# Patient Record
Sex: MALE | Race: WHITE | NOT HISPANIC OR LATINO | Employment: UNEMPLOYED | URBAN - METROPOLITAN AREA
[De-identification: names, ages, dates, MRNs, and addresses within clinical notes are randomized per-mention and may not be internally consistent; named-entity substitution may affect disease eponyms.]

---

## 2023-06-03 ENCOUNTER — HOSPITAL ENCOUNTER (INPATIENT)
Facility: HOSPITAL | Age: 63
LOS: 3 days | Discharge: HOME | End: 2023-06-06
Attending: HOSPITALIST | Admitting: HOSPITALIST
Payer: MEDICAID

## 2023-06-03 DIAGNOSIS — N49.2 SCROTAL ABSCESS: Primary | ICD-10-CM

## 2023-06-03 DIAGNOSIS — L02.91 ABSCESS: ICD-10-CM

## 2023-06-04 ENCOUNTER — ANESTHESIA EVENT (OUTPATIENT)
Dept: PERIOP | Facility: HOSPITAL | Age: 63
End: 2023-06-04
Payer: MEDICAID

## 2023-06-04 ENCOUNTER — ANESTHESIA (OUTPATIENT)
Dept: PERIOP | Facility: HOSPITAL | Age: 63
End: 2023-06-04
Payer: MEDICAID

## 2023-06-04 PROBLEM — N49.2 SCROTAL ABSCESS: Status: ACTIVE | Noted: 2023-06-04

## 2023-06-04 LAB
ANION GAP SERPL CALCULATED.3IONS-SCNC: 12 MMOL/L (ref 5–15)
BASOPHILS # BLD AUTO: 0 10*3/MM3 (ref 0–0.2)
BASOPHILS NFR BLD AUTO: 0.5 % (ref 0–1.5)
BUN SERPL-MCNC: 12 MG/DL (ref 8–23)
BUN/CREAT SERPL: 13.6 (ref 7–25)
CALCIUM SPEC-SCNC: 8.3 MG/DL (ref 8.6–10.5)
CHLORIDE SERPL-SCNC: 101 MMOL/L (ref 98–107)
CO2 SERPL-SCNC: 25 MMOL/L (ref 22–29)
CREAT SERPL-MCNC: 0.88 MG/DL (ref 0.76–1.27)
DEPRECATED RDW RBC AUTO: 47.3 FL (ref 37–54)
EGFRCR SERPLBLD CKD-EPI 2021: 98.4 ML/MIN/1.73
EOSINOPHIL # BLD AUTO: 0.1 10*3/MM3 (ref 0–0.4)
EOSINOPHIL NFR BLD AUTO: 1.3 % (ref 0.3–6.2)
ERYTHROCYTE [DISTWIDTH] IN BLOOD BY AUTOMATED COUNT: 14 % (ref 12.3–15.4)
GLUCOSE SERPL-MCNC: 139 MG/DL (ref 65–99)
HCT VFR BLD AUTO: 42.9 % (ref 37.5–51)
HGB BLD-MCNC: 14.9 G/DL (ref 13–17.7)
LYMPHOCYTES # BLD AUTO: 1.6 10*3/MM3 (ref 0.7–3.1)
LYMPHOCYTES NFR BLD AUTO: 17.6 % (ref 19.6–45.3)
MCH RBC QN AUTO: 31.7 PG (ref 26.6–33)
MCHC RBC AUTO-ENTMCNC: 34.7 G/DL (ref 31.5–35.7)
MCV RBC AUTO: 91.3 FL (ref 79–97)
MONOCYTES # BLD AUTO: 0.8 10*3/MM3 (ref 0.1–0.9)
MONOCYTES NFR BLD AUTO: 9.4 % (ref 5–12)
NEUTROPHILS NFR BLD AUTO: 6.3 10*3/MM3 (ref 1.7–7)
NEUTROPHILS NFR BLD AUTO: 71.2 % (ref 42.7–76)
NRBC BLD AUTO-RTO: 0.3 /100 WBC (ref 0–0.2)
PLATELET # BLD AUTO: 172 10*3/MM3 (ref 140–450)
PMV BLD AUTO: 9 FL (ref 6–12)
POTASSIUM SERPL-SCNC: 3.9 MMOL/L (ref 3.5–5.2)
RBC # BLD AUTO: 4.7 10*6/MM3 (ref 4.14–5.8)
SODIUM SERPL-SCNC: 138 MMOL/L (ref 136–145)
WBC NRBC COR # BLD: 8.9 10*3/MM3 (ref 3.4–10.8)

## 2023-06-04 PROCEDURE — 87205 SMEAR GRAM STAIN: CPT | Performed by: UROLOGY

## 2023-06-04 PROCEDURE — 80048 BASIC METABOLIC PNL TOTAL CA: CPT | Performed by: HOSPITALIST

## 2023-06-04 PROCEDURE — 25010000002 ONDANSETRON PER 1 MG: Performed by: HOSPITALIST

## 2023-06-04 PROCEDURE — 25010000002 SUCCINYLCHOLINE PER 20 MG: Performed by: NURSE ANESTHETIST, CERTIFIED REGISTERED

## 2023-06-04 PROCEDURE — 94640 AIRWAY INHALATION TREATMENT: CPT

## 2023-06-04 PROCEDURE — 0VB50ZZ EXCISION OF SCROTUM, OPEN APPROACH: ICD-10-PCS | Performed by: UROLOGY

## 2023-06-04 PROCEDURE — 25010000002 FENTANYL CITRATE (PF) 100 MCG/2ML SOLUTION: Performed by: NURSE ANESTHETIST, CERTIFIED REGISTERED

## 2023-06-04 PROCEDURE — 25010000002 PROPOFOL 200 MG/20ML EMULSION: Performed by: NURSE ANESTHETIST, CERTIFIED REGISTERED

## 2023-06-04 PROCEDURE — 25010000002 HYDROMORPHONE 1 MG/ML SOLUTION: Performed by: UROLOGY

## 2023-06-04 PROCEDURE — 87076 CULTURE ANAEROBE IDENT EACH: CPT | Performed by: UROLOGY

## 2023-06-04 PROCEDURE — 25010000002 ENOXAPARIN PER 10 MG: Performed by: UROLOGY

## 2023-06-04 PROCEDURE — 88304 TISSUE EXAM BY PATHOLOGIST: CPT | Performed by: UROLOGY

## 2023-06-04 PROCEDURE — 87070 CULTURE OTHR SPECIMN AEROBIC: CPT | Performed by: UROLOGY

## 2023-06-04 PROCEDURE — 87077 CULTURE AEROBIC IDENTIFY: CPT | Performed by: UROLOGY

## 2023-06-04 PROCEDURE — 25010000002 DEXAMETHASONE PER 1 MG: Performed by: NURSE ANESTHETIST, CERTIFIED REGISTERED

## 2023-06-04 PROCEDURE — 25010000002 ONDANSETRON PER 1 MG: Performed by: NURSE ANESTHETIST, CERTIFIED REGISTERED

## 2023-06-04 PROCEDURE — 94799 UNLISTED PULMONARY SVC/PX: CPT

## 2023-06-04 PROCEDURE — 87075 CULTR BACTERIA EXCEPT BLOOD: CPT | Performed by: UROLOGY

## 2023-06-04 PROCEDURE — 85025 COMPLETE CBC W/AUTO DIFF WBC: CPT | Performed by: HOSPITALIST

## 2023-06-04 PROCEDURE — 94664 DEMO&/EVAL PT USE INHALER: CPT

## 2023-06-04 RX ORDER — FENTANYL CITRATE 50 UG/ML
INJECTION, SOLUTION INTRAMUSCULAR; INTRAVENOUS AS NEEDED
Status: DISCONTINUED | OUTPATIENT
Start: 2023-06-04 | End: 2023-06-04 | Stop reason: SURG

## 2023-06-04 RX ORDER — SODIUM CHLORIDE 9 MG/ML
100 INJECTION, SOLUTION INTRAVENOUS CONTINUOUS
Status: DISCONTINUED | OUTPATIENT
Start: 2023-06-04 | End: 2023-06-06 | Stop reason: HOSPADM

## 2023-06-04 RX ORDER — HYDROCODONE BITARTRATE AND ACETAMINOPHEN 7.5; 325 MG/1; MG/1
1 TABLET ORAL EVERY 6 HOURS PRN
Qty: 20 TABLET | Refills: 0 | Status: SHIPPED | OUTPATIENT
Start: 2023-06-04

## 2023-06-04 RX ORDER — IPRATROPIUM BROMIDE AND ALBUTEROL SULFATE 2.5; .5 MG/3ML; MG/3ML
3 SOLUTION RESPIRATORY (INHALATION) ONCE AS NEEDED
Status: COMPLETED | OUTPATIENT
Start: 2023-06-04 | End: 2023-06-04

## 2023-06-04 RX ORDER — LIDOCAINE HYDROCHLORIDE 20 MG/ML
INJECTION, SOLUTION EPIDURAL; INFILTRATION; INTRACAUDAL; PERINEURAL AS NEEDED
Status: DISCONTINUED | OUTPATIENT
Start: 2023-06-04 | End: 2023-06-04 | Stop reason: SURG

## 2023-06-04 RX ORDER — HYDROCODONE BITARTRATE AND ACETAMINOPHEN 7.5; 325 MG/1; MG/1
2 TABLET ORAL EVERY 4 HOURS PRN
Status: DISCONTINUED | OUTPATIENT
Start: 2023-06-04 | End: 2023-06-06 | Stop reason: HOSPADM

## 2023-06-04 RX ORDER — NICOTINE 21 MG/24HR
1 PATCH, TRANSDERMAL 24 HOURS TRANSDERMAL
Status: DISCONTINUED | OUTPATIENT
Start: 2023-06-04 | End: 2023-06-06 | Stop reason: HOSPADM

## 2023-06-04 RX ORDER — PROPOFOL 10 MG/ML
INJECTION, EMULSION INTRAVENOUS AS NEEDED
Status: DISCONTINUED | OUTPATIENT
Start: 2023-06-04 | End: 2023-06-04 | Stop reason: SURG

## 2023-06-04 RX ORDER — SODIUM CHLORIDE 9 MG/ML
40 INJECTION, SOLUTION INTRAVENOUS AS NEEDED
Status: DISCONTINUED | OUTPATIENT
Start: 2023-06-04 | End: 2023-06-06 | Stop reason: HOSPADM

## 2023-06-04 RX ORDER — SUCCINYLCHOLINE CHLORIDE 20 MG/ML
INJECTION INTRAMUSCULAR; INTRAVENOUS AS NEEDED
Status: DISCONTINUED | OUTPATIENT
Start: 2023-06-04 | End: 2023-06-04 | Stop reason: SURG

## 2023-06-04 RX ORDER — ONDANSETRON 2 MG/ML
4 INJECTION INTRAMUSCULAR; INTRAVENOUS EVERY 6 HOURS PRN
Status: DISCONTINUED | OUTPATIENT
Start: 2023-06-04 | End: 2023-06-04 | Stop reason: SDUPTHER

## 2023-06-04 RX ORDER — SODIUM CHLORIDE 0.9 % (FLUSH) 0.9 %
10 SYRINGE (ML) INJECTION EVERY 12 HOURS SCHEDULED
Status: DISCONTINUED | OUTPATIENT
Start: 2023-06-04 | End: 2023-06-06 | Stop reason: HOSPADM

## 2023-06-04 RX ORDER — BISACODYL 10 MG
10 SUPPOSITORY, RECTAL RECTAL DAILY PRN
Status: DISCONTINUED | OUTPATIENT
Start: 2023-06-04 | End: 2023-06-06 | Stop reason: HOSPADM

## 2023-06-04 RX ORDER — ENOXAPARIN SODIUM 100 MG/ML
40 INJECTION SUBCUTANEOUS EVERY 12 HOURS SCHEDULED
Status: DISCONTINUED | OUTPATIENT
Start: 2023-06-04 | End: 2023-06-06 | Stop reason: HOSPADM

## 2023-06-04 RX ORDER — LEVOFLOXACIN 500 MG/1
500 TABLET, FILM COATED ORAL DAILY
Qty: 20 TABLET | Refills: 0 | Status: SHIPPED | OUTPATIENT
Start: 2023-06-04 | End: 2023-06-06 | Stop reason: HOSPADM

## 2023-06-04 RX ORDER — SODIUM CHLORIDE 0.9 % (FLUSH) 0.9 %
10 SYRINGE (ML) INJECTION AS NEEDED
Status: DISCONTINUED | OUTPATIENT
Start: 2023-06-04 | End: 2023-06-06 | Stop reason: HOSPADM

## 2023-06-04 RX ORDER — ONDANSETRON 2 MG/ML
4 INJECTION INTRAMUSCULAR; INTRAVENOUS EVERY 6 HOURS PRN
Status: DISCONTINUED | OUTPATIENT
Start: 2023-06-04 | End: 2023-06-06 | Stop reason: HOSPADM

## 2023-06-04 RX ORDER — DEXAMETHASONE SODIUM PHOSPHATE 4 MG/ML
INJECTION, SOLUTION INTRA-ARTICULAR; INTRALESIONAL; INTRAMUSCULAR; INTRAVENOUS; SOFT TISSUE AS NEEDED
Status: DISCONTINUED | OUTPATIENT
Start: 2023-06-04 | End: 2023-06-04 | Stop reason: SURG

## 2023-06-04 RX ORDER — NALOXONE HCL 0.4 MG/ML
0.1 VIAL (ML) INJECTION
Status: DISCONTINUED | OUTPATIENT
Start: 2023-06-04 | End: 2023-06-06

## 2023-06-04 RX ORDER — NALOXONE HCL 0.4 MG/ML
0.4 VIAL (ML) INJECTION AS NEEDED
Status: DISCONTINUED | OUTPATIENT
Start: 2023-06-04 | End: 2023-06-06 | Stop reason: HOSPADM

## 2023-06-04 RX ORDER — CLINDAMYCIN PHOSPHATE 600 MG/50ML
600 INJECTION, SOLUTION INTRAVENOUS EVERY 8 HOURS
Status: DISCONTINUED | OUTPATIENT
Start: 2023-06-04 | End: 2023-06-06

## 2023-06-04 RX ORDER — AMOXICILLIN 250 MG
2 CAPSULE ORAL 2 TIMES DAILY
Status: DISCONTINUED | OUTPATIENT
Start: 2023-06-04 | End: 2023-06-06 | Stop reason: HOSPADM

## 2023-06-04 RX ORDER — ESMOLOL HYDROCHLORIDE 10 MG/ML
INJECTION INTRAVENOUS AS NEEDED
Status: DISCONTINUED | OUTPATIENT
Start: 2023-06-04 | End: 2023-06-04 | Stop reason: SURG

## 2023-06-04 RX ORDER — ACETAMINOPHEN 325 MG/1
650 TABLET ORAL EVERY 4 HOURS PRN
Status: DISCONTINUED | OUTPATIENT
Start: 2023-06-04 | End: 2023-06-06 | Stop reason: HOSPADM

## 2023-06-04 RX ORDER — NALOXONE HCL 0.4 MG/ML
0.4 VIAL (ML) INJECTION
Status: DISCONTINUED | OUTPATIENT
Start: 2023-06-04 | End: 2023-06-06

## 2023-06-04 RX ORDER — POLYETHYLENE GLYCOL 3350 17 G/17G
17 POWDER, FOR SOLUTION ORAL DAILY PRN
Status: DISCONTINUED | OUTPATIENT
Start: 2023-06-04 | End: 2023-06-06 | Stop reason: HOSPADM

## 2023-06-04 RX ORDER — SODIUM CHLORIDE, SODIUM LACTATE, POTASSIUM CHLORIDE, CALCIUM CHLORIDE 600; 310; 30; 20 MG/100ML; MG/100ML; MG/100ML; MG/100ML
INJECTION, SOLUTION INTRAVENOUS CONTINUOUS PRN
Status: DISCONTINUED | OUTPATIENT
Start: 2023-06-04 | End: 2023-06-04 | Stop reason: SURG

## 2023-06-04 RX ORDER — ACETAMINOPHEN 650 MG/1
650 SUPPOSITORY RECTAL EVERY 4 HOURS PRN
Status: DISCONTINUED | OUTPATIENT
Start: 2023-06-04 | End: 2023-06-06 | Stop reason: HOSPADM

## 2023-06-04 RX ORDER — ONDANSETRON 2 MG/ML
INJECTION INTRAMUSCULAR; INTRAVENOUS AS NEEDED
Status: DISCONTINUED | OUTPATIENT
Start: 2023-06-04 | End: 2023-06-04 | Stop reason: SURG

## 2023-06-04 RX ORDER — ALBUTEROL SULFATE 2.5 MG/3ML
SOLUTION RESPIRATORY (INHALATION) AS NEEDED
Status: DISCONTINUED | OUTPATIENT
Start: 2023-06-04 | End: 2023-06-04 | Stop reason: SURG

## 2023-06-04 RX ORDER — BISACODYL 5 MG/1
5 TABLET, DELAYED RELEASE ORAL DAILY PRN
Status: DISCONTINUED | OUTPATIENT
Start: 2023-06-04 | End: 2023-06-06 | Stop reason: HOSPADM

## 2023-06-04 RX ORDER — FENTANYL CITRATE 50 UG/ML
50 INJECTION, SOLUTION INTRAMUSCULAR; INTRAVENOUS
Status: DISCONTINUED | OUTPATIENT
Start: 2023-06-04 | End: 2023-06-06

## 2023-06-04 RX ORDER — ONDANSETRON 4 MG/1
4 TABLET, FILM COATED ORAL EVERY 6 HOURS PRN
Status: DISCONTINUED | OUTPATIENT
Start: 2023-06-04 | End: 2023-06-06 | Stop reason: HOSPADM

## 2023-06-04 RX ADMIN — FENTANYL CITRATE 50 MCG: 50 INJECTION, SOLUTION INTRAMUSCULAR; INTRAVENOUS at 13:37

## 2023-06-04 RX ADMIN — SODIUM CHLORIDE, SODIUM LACTATE, POTASSIUM CHLORIDE, AND CALCIUM CHLORIDE: .6; .31; .03; .02 INJECTION, SOLUTION INTRAVENOUS at 13:32

## 2023-06-04 RX ADMIN — SODIUM CHLORIDE 100 ML/HR: 9 INJECTION, SOLUTION INTRAVENOUS at 22:02

## 2023-06-04 RX ADMIN — ONDANSETRON 4 MG: 2 INJECTION INTRAMUSCULAR; INTRAVENOUS at 14:11

## 2023-06-04 RX ADMIN — Medication 10 ML: at 08:57

## 2023-06-04 RX ADMIN — SUCCINYLCHOLINE CHLORIDE 180 MG: 20 INJECTION, SOLUTION INTRAMUSCULAR; INTRAVENOUS at 13:43

## 2023-06-04 RX ADMIN — PROPOFOL 250 MG: 10 INJECTION, EMULSION INTRAVENOUS at 13:42

## 2023-06-04 RX ADMIN — Medication 10 ML: at 02:27

## 2023-06-04 RX ADMIN — NICOTINE 1 PATCH: 21 PATCH, EXTENDED RELEASE TRANSDERMAL at 19:01

## 2023-06-04 RX ADMIN — FENTANYL CITRATE 50 MCG: 50 INJECTION, SOLUTION INTRAMUSCULAR; INTRAVENOUS at 13:32

## 2023-06-04 RX ADMIN — CLINDAMYCIN PHOSPHATE 600 MG: 600 INJECTION, SOLUTION INTRAVENOUS at 10:55

## 2023-06-04 RX ADMIN — DEXAMETHASONE SODIUM PHOSPHATE 8 MG: 4 INJECTION, SOLUTION INTRAMUSCULAR; INTRAVENOUS at 13:45

## 2023-06-04 RX ADMIN — ONDANSETRON 4 MG: 2 INJECTION INTRAMUSCULAR; INTRAVENOUS at 05:25

## 2023-06-04 RX ADMIN — CLINDAMYCIN PHOSPHATE 600 MG: 600 INJECTION, SOLUTION INTRAVENOUS at 02:20

## 2023-06-04 RX ADMIN — HYDROMORPHONE HYDROCHLORIDE 0.5 MG: 1 INJECTION, SOLUTION INTRAMUSCULAR; INTRAVENOUS; SUBCUTANEOUS at 16:52

## 2023-06-04 RX ADMIN — ALBUTEROL SULFATE 2.5 MG: 2.5 SOLUTION RESPIRATORY (INHALATION) at 13:46

## 2023-06-04 RX ADMIN — IPRATROPIUM BROMIDE AND ALBUTEROL SULFATE 3 ML: .5; 3 SOLUTION RESPIRATORY (INHALATION) at 14:39

## 2023-06-04 RX ADMIN — ENOXAPARIN SODIUM 40 MG: 100 INJECTION SUBCUTANEOUS at 20:31

## 2023-06-04 RX ADMIN — SODIUM CHLORIDE 100 ML/HR: 9 INJECTION, SOLUTION INTRAVENOUS at 16:54

## 2023-06-04 RX ADMIN — LIDOCAINE HYDROCHLORIDE 100 MG: 20 INJECTION, SOLUTION EPIDURAL; INFILTRATION; INTRACAUDAL; PERINEURAL at 13:39

## 2023-06-04 RX ADMIN — SENNOSIDES AND DOCUSATE SODIUM 2 TABLET: 50; 8.6 TABLET ORAL at 20:31

## 2023-06-04 RX ADMIN — HYDROCODONE BITARTRATE AND ACETAMINOPHEN 2 TABLET: 7.5; 325 TABLET ORAL at 21:49

## 2023-06-04 RX ADMIN — ESMOLOL HYDROCHLORIDE 30 MG: 10 INJECTION, SOLUTION INTRAVENOUS at 14:17

## 2023-06-04 RX ADMIN — CLINDAMYCIN PHOSPHATE 600 MG: 600 INJECTION, SOLUTION INTRAVENOUS at 16:55

## 2023-06-04 NOTE — CONSULTS
Urology Consult Note    Patient:Godwin Gamez Jr. :1960  Room:[facility]  Admit Date6/3/2023  Age:62 y.o.     SEX:male     DOS:2023     MR:[id number]     Visit:[id number]       Attending: Isak Bailon DO  Referring Provider: Dr. Bailon  Reason for Consultation: Right testicular abscess    Patient Care Team:  Provider, No Known as PCP - General    Chief complaint right testicular pain    Subjective .     History of present illness: Patient is a 62-year-old white male who is had a 1 month history of a right testicle infection.  He has been on antibiotics and not getting better.  He was transferred from [facility] last night for a definitive treatment.    Review of Systems  12 point review of systems were reviewed and are negative except for what is in HPI.    History  History reviewed. No pertinent past medical history.  History reviewed. No pertinent surgical history.  Social History     Socioeconomic History    Marital status:    Tobacco Use    Smoking status: Every Day     Packs/day: 2.00     Years: 40.00     Pack years: 80.00     Types: Cigarettes   Vaping Use    Vaping Use: Unknown   Substance and Sexual Activity    Drug use: Not Currently    Sexual activity: Defer     History reviewed. No pertinent family history.  Allergies   Allergen Reactions    Valium [Diazepam] Hallucinations    Penicillins Hives     Prior to Admission medications    Not on File         Objective     tMax 24 hours:  Temp (24hrs), Av °F (36.7 °C), Min:97.5 °F (36.4 °C), Max:98.3 °F (36.8 °C)    Vital Sign Ranges:  Temp:  [97.5 °F (36.4 °C)-98.3 °F (36.8 °C)] 98.3 °F (36.8 °C)  Heart Rate:  [72-82] 82  Resp:  [14-15] 14  BP: (112-121)/(67-98) 112/98  Intake and Output Last 3 Shifts:  I/O last 3 completed shifts:  In: -   Out: 500 [Urine:500]      Physical Exam:     General Appearance:    Alert, cooperative, in no acute distress   Head:    Normocephalic, without obvious abnormality, atraumatic   Eyes:             Lids and lashes normal, conjunctivae and sclerae normal, no   icterus, no pallor, corneas clear, PERRLA   Ears:    Ears appear intact with no abnormalities noted   Throat:   No oral lesions, no thrush, oral mucosa moist   Neck:   No adenopathy, supple, trachea midline, no thyromegaly, no   carotid bruit, no JVD   Back:     No kyphosis present, no scoliosis present, no skin lesions,      erythema or scars, no tenderness to percussion or                   palpation,   range of motion normal   Lungs:     Clear to auscultation,respirations regular, even and                  unlabored    Heart:    Regular rhythm and normal rate, normal S1 and S2, no            murmur, no gallop, no rub, no click   Chest Wall:    No abnormalities observed   Abdomen:     Normal bowel sounds, no masses, no organomegaly, soft        non-tender, non-distended, no guarding, no rebound                tenderness   Rectal:     Deferred   Extremities:   Moves all extremities well, no edema, no cyanosis, no             redness   Pulses:   Pulses palpable and equal bilaterally   Skin:   No bleeding, bruising or rash   Lymph nodes:   No palpable adenopathy   Neurologic:   Cranial nerves 2 - 12 grossly intact, sensation intact, DTR       present and equal bilaterally       Results Review:     Lab Results (last 24 hours)       Procedure Component Value Units Date/Time    Basic Metabolic Panel [[id number]]  (Abnormal) Collected: 06/04/23 0400    Specimen: Blood Updated: 06/04/23 0442     Glucose 139 mg/dL      BUN 12 mg/dL      Creatinine 0.88 mg/dL      Sodium 138 mmol/L      Potassium 3.9 mmol/L      Chloride 101 mmol/L      CO2 25.0 mmol/L      Calcium 8.3 mg/dL      BUN/Creatinine Ratio 13.6     Anion Gap 12.0 mmol/L      eGFR 98.4 mL/min/1.73     Narrative:      GFR Normal >60  Chronic Kidney Disease <60  Kidney Failure <15      CBC & Differential [[id number]]  (Abnormal) Collected: 06/04/23 0400    Specimen: Blood Updated: 06/04/23 0417     Narrative:      The following orders were created for panel order CBC & Differential.  Procedure                               Abnormality         Status                     ---------                               -----------         ------                     CBC Auto Differential[[id number]]        Abnormal            Final result                 Please view results for these tests on the individual orders.    CBC Auto Differential [[id number]]  (Abnormal) Collected: 06/04/23 0400    Specimen: Blood Updated: 06/04/23 0417     WBC 8.90 10*3/mm3      RBC 4.70 10*6/mm3      Hemoglobin 14.9 g/dL      Hematocrit 42.9 %      MCV 91.3 fL      MCH 31.7 pg      MCHC 34.7 g/dL      RDW 14.0 %      RDW-SD 47.3 fl      MPV 9.0 fL      Platelets 172 10*3/mm3      Neutrophil % 71.2 %      Lymphocyte % 17.6 %      Monocyte % 9.4 %      Eosinophil % 1.3 %      Basophil % 0.5 %      Neutrophils, Absolute 6.30 10*3/mm3      Lymphocytes, Absolute 1.60 10*3/mm3      Monocytes, Absolute 0.80 10*3/mm3      Eosinophils, Absolute 0.10 10*3/mm3      Basophils, Absolute 0.00 10*3/mm3      nRBC 0.3 /100 WBC            No results found for: URINECX     Imaging Results (Last 7 Days)       ** No results found for the last 168 hours. **            Inpatient Meds:   Scheduled Meds:clindamycin, 600 mg, Intravenous, Q8H  enoxaparin, 40 mg, Subcutaneous, Q12H  senna-docusate sodium, 2 tablet, Oral, BID  sodium chloride, 10 mL, Intravenous, Q12H       Continuous Infusions:    PRN Meds:.  senna-docusate sodium **AND** polyethylene glycol **AND** bisacodyl **AND** bisacodyl    ondansetron    sodium chloride    sodium chloride      Assessment & Plan     Right testicular abscess/orchitis    On antibiotics and plan emergent incision and drainage of right testicular abscess and possible right orchiectomy.  Discussed with patient and wife all risk benefits and options and they are willing to proceed    I discussed the patient's findings and my  recommendations with patient.    Cristian Vaughn MD  06/04/23  08:25 EDT

## 2023-06-04 NOTE — PLAN OF CARE
Goal Outcome Evaluation:         Patient awaiting I&D this afternoon. Wife at bedside. Continues to have pain in right testicle. IV ABT continues

## 2023-06-04 NOTE — H&P
[facility]      Patient Name: Godwin Gamez Jr.  : 1960  MRN: [id number]  Primary Care Physician:  Provider, No Known  Date of admission: 6/3/2023      Subjective      Chief Complaint: Scrotal swelling    History of Present Illness: Godwin Gamez Jr. is a 62 y.o. male who presented to [facility] on 6/3/2023 from [facility] after he developed scrotal swelling for the last 3 to 4 weeks.  He said initially there was a small swelling with a spontaneous drainage, now over the last week or 2 the swelling continued to get worse, patient noted to have redness and firm abscess with no spontaneous drainage on right side of the scrotum was noted.  Patient received a dose of IV clindamycin, urology service with Dr. Vaughn was spoken from [facility], he advised hospital service to admit with consult to him.      Review of Systems   All other systems reviewed and are negative.     Personal History     History reviewed. No pertinent past medical history.  Past medical history denies history of diabetes, no hypertension.  Patient does have medical history of tobacco abuse.  History reviewed. No pertinent surgical history.    Family History: family history is not on file. Otherwise pertinent FHx was reviewed and not pertinent to current issue.    Social History:  reports that he has been smoking cigarettes. He has a 80.00 pack-year smoking history. He does not have any smokeless tobacco history on file. He reports that he does not currently use drugs.    Home Medications:  Prior to Admission Medications       None              Allergies:  Allergies   Allergen Reactions    Valium [Diazepam] Hallucinations    Penicillins Hives       Objective      Vitals:   Temp:  [97.5 °F (36.4 °C)] 97.5 °F (36.4 °C)  Heart Rate:  [72] 72  Resp:  [15] 15  BP: (121)/(73) 121/73    Physical Exam  Vitals and nursing note reviewed.   Constitutional:       General: He is not  in acute distress.     Appearance: Normal appearance. He is well-developed. He is not ill-appearing, toxic-appearing or diaphoretic.   HENT:      Head: Normocephalic and atraumatic.      Right Ear: Ear canal and external ear normal.      Left Ear: Ear canal and external ear normal.      Nose: Nose normal. No congestion or rhinorrhea.      Mouth/Throat:      Mouth: Mucous membranes are moist.      Pharynx: No oropharyngeal exudate.   Eyes:      General: No scleral icterus.        Right eye: No discharge.         Left eye: No discharge.      Extraocular Movements: Extraocular movements intact.      Conjunctiva/sclera: Conjunctivae normal.      Pupils: Pupils are equal, round, and reactive to light.   Neck:      Thyroid: No thyromegaly.      Vascular: No carotid bruit or JVD.      Trachea: No tracheal deviation.   Cardiovascular:      Rate and Rhythm: Normal rate and regular rhythm.      Pulses: Normal pulses.      Heart sounds: Normal heart sounds. No murmur heard.    No friction rub. No gallop.   Pulmonary:      Effort: Pulmonary effort is normal. No respiratory distress.      Breath sounds: Normal breath sounds. No stridor. No wheezing, rhonchi or rales.   Chest:      Chest wall: No tenderness.   Abdominal:      General: Bowel sounds are normal. There is no distension.      Palpations: Abdomen is soft. There is no mass.      Tenderness: There is no abdominal tenderness. There is no guarding or rebound.      Hernia: No hernia is present.   Musculoskeletal:         General: No swelling, tenderness, deformity or signs of injury. Normal range of motion.      Cervical back: Normal range of motion and neck supple. No rigidity. No muscular tenderness.      Right lower leg: No edema.      Left lower leg: No edema.   Lymphadenopathy:      Cervical: No cervical adenopathy.   Skin:     General: Skin is warm and dry.      Coloration: Skin is not jaundiced or pale.      Findings: No bruising, erythema or rash.   Neurological:       General: No focal deficit present.      Mental Status: He is alert and oriented to person, place, and time. Mental status is at baseline.      Cranial Nerves: No cranial nerve deficit.      Sensory: No sensory deficit.      Motor: No weakness or abnormal muscle tone.      Coordination: Coordination normal.   Psychiatric:         Mood and Affect: Mood normal.         Behavior: Behavior normal.         Thought Content: Thought content normal.         Judgment: Judgment normal.        Result Review    Result Review:  I have personally reviewed the results from the time of this admission to 6/4/2023 00:46 EDT and agree with these findings:  [x]  Laboratory  [x]  Microbiology  [x]  Radiology  []  EKG/Telemetry   []  Cardiology/Vascular   []  Pathology  []  Old records  []  Other:  Most notable findings include:       Assessment & Plan        Active Hospital Problems:  Active Hospital Problems    Diagnosis     **Scrotal abscess      Plan:     Scrotal abscess right side, IV clindamycin, urology consult to evaluate for incision and drainage, wound cultures at the time of incision and drainage, patient may require infectious disease consult.    Tobacco abuse, patient counseled for cessation.      DVT prophylaxis:  Medical DVT prophylaxis orders are present.    CODE STATUS:    Level Of Support Discussed With: Patient  Code Status (Patient has no pulse and is not breathing): CPR (Attempt to Resuscitate)  Medical Interventions (Patient has pulse or is breathing): Full Support    Admission Status:  I believe this patient meets inpatient status.    I discussed the patient's findings and my recommendations with patient.    This patient has been examined wearing appropriate Personal Protective Equipment and discussed with hospital infection control department. 06/04/23      Signature:

## 2023-06-04 NOTE — ANESTHESIA POSTPROCEDURE EVALUATION
Patient: Godwin Gamez Jr.    Procedure Summary       Date: 06/04/23 Room / Location: [facility] OR [address] [place] [address] MAIN OR    Anesthesia Start: 1332 Anesthesia Stop: 1426    Procedure: EXCISION OF RIGHT SCROTAL ABSCESS WITH CULTURE (Right) Diagnosis:     Surgeons: Cristian Vaughn MD Provider: Sima Tobar CRNA    Anesthesia Type: general ASA Status: 3            Anesthesia Type: No value filed.    Vitals  Vitals Value Taken Time   /94 06/04/23 1513   Temp 98.7 °F (37.1 °C) 06/04/23 1513   Pulse 104 06/04/23 1513   Resp 17 06/04/23 1513   SpO2 94 % 06/04/23 1513           Post Anesthesia Care and Evaluation    Patient location during evaluation: PACU  Patient participation: complete - patient participated  Level of consciousness: awake  Pain scale: See nurse's notes for pain score.  Pain management: adequate    Airway patency: patent  Anesthetic complications: No anesthetic complications  PONV Status: none  Cardiovascular status: acceptable  Respiratory status: acceptable and spontaneous ventilation  Hydration status: acceptable    Comments: Patient seen and examined postoperatively; vital signs stable; SpO2 greater than or equal to 90%; cardiopulmonary status stable; nausea/vomiting adequately controlled; pain adequately controlled; no apparent anesthesia complications; patient discharged from anesthesia care when discharge criteria were met

## 2023-06-04 NOTE — CONSULTS
LOS: 1 day   Patient Care Team:  Provider, No Known as PCP - General        Subjective       Attending MD : Isak Bailon DO    Patient Complaints: pain         History of Present Illness  :: Godwin Gamez Jr. is a 62 y.o. male who presented to [facility] on 6/3/2023 from [facility] after he developed scrotal swelling for the last 3 to 4 weeks.  He said initially there was a small swelling with a spontaneous drainage, now over the last week or 2 the swelling continued to get worse, patient noted to have redness and firm abscess with no spontaneous drainage on right side of the scrotum was noted.  Patient received a dose of IV clindamycin,      Patient Denies:  NV    PMH :   Scrotal abscess      Review of Systems:    Pain    Objective     Vital Signs  Temp:  [97.5 °F (36.4 °C)-98.3 °F (36.8 °C)] 98.2 °F (36.8 °C)  Heart Rate:  [] 109  Resp:  [10-26] 23  BP: (112-152)/(67-98) 152/86    Physical Exam:     General Appearance:    Alert, cooperative, in no acute distress   Head:    Normocephalic, without obvious abnormality, atraumatic   Eyes:            Lids and lashes normal, conjunctivae and sclerae normal, no   icterus, no pallor, corneas clear, PERRLA   Ears:    Ears appear intact with no abnormalities noted   Throat:   No oral lesions, no thrush, oral mucosa moist   Neck:   No adenopathy, supple, trachea midline, no thyromegaly, no   carotid bruit, no JVD   Back:     No kyphosis present, no scoliosis present, no skin lesions,      erythema or scars, no tenderness to percussion or                   palpation,   range of motion normal   Lungs:     Clear to auscultation,respirations regular, even and                  unlabored    Heart:    Regular rhythm and normal rate, normal S1 and S2, no            murmur, no gallop, no rub, no click   Chest Wall:    No abnormalities observed   Abdomen:     Normal bowel sounds, no masses, no organomegaly, soft        non-tender, non-distended,  no guarding, no rebound                tenderness   Rectal:     Deferred   Extremities:   Moves all extremities well, no edema, no cyanosis, no             redness   Pulses:   Pulses palpable and equal bilaterally   Skin:   No bleeding, bruising or rash   Lymph nodes:   No palpable adenopathy   Neurologic:   Cranial nerves 2 - 12 grossly intact, sensation intact, DTR       present and equal bilaterally          Results Review:    Lab Results (last 72 hours)       Procedure Component Value Units Date/Time    Anaerobic Culture - Wound, Scrotum [[id number]] Collected: 06/04/23 1356    Specimen: Wound from Scrotum Updated: 06/04/23 1403    Wound Culture - Wound, Scrotum [[id number]] Collected: 06/04/23 1356    Specimen: Wound from Scrotum Updated: 06/04/23 1403    Basic Metabolic Panel [[id number]]  (Abnormal) Collected: 06/04/23 0400    Specimen: Blood Updated: 06/04/23 0442     Glucose 139 mg/dL      BUN 12 mg/dL      Creatinine 0.88 mg/dL      Sodium 138 mmol/L      Potassium 3.9 mmol/L      Chloride 101 mmol/L      CO2 25.0 mmol/L      Calcium 8.3 mg/dL      BUN/Creatinine Ratio 13.6     Anion Gap 12.0 mmol/L      eGFR 98.4 mL/min/1.73     Narrative:      GFR Normal >60  Chronic Kidney Disease <60  Kidney Failure <15      CBC & Differential [[id number]]  (Abnormal) Collected: 06/04/23 0400    Specimen: Blood Updated: 06/04/23 0417    Narrative:      The following orders were created for panel order CBC & Differential.  Procedure                               Abnormality         Status                     ---------                               -----------         ------                     CBC Auto Differential[[id number]]        Abnormal            Final result                 Please view results for these tests on the individual orders.    CBC Auto Differential [[id number]]  (Abnormal) Collected: 06/04/23 0400    Specimen: Blood Updated: 06/04/23 0417     WBC 8.90 10*3/mm3      RBC 4.70 10*6/mm3      Hemoglobin 14.9  g/dL      Hematocrit 42.9 %      MCV 91.3 fL      MCH 31.7 pg      MCHC 34.7 g/dL      RDW 14.0 %      RDW-SD 47.3 fl      MPV 9.0 fL      Platelets 172 10*3/mm3      Neutrophil % 71.2 %      Lymphocyte % 17.6 %      Monocyte % 9.4 %      Eosinophil % 1.3 %      Basophil % 0.5 %      Neutrophils, Absolute 6.30 10*3/mm3      Lymphocytes, Absolute 1.60 10*3/mm3      Monocytes, Absolute 0.80 10*3/mm3      Eosinophils, Absolute 0.10 10*3/mm3      Basophils, Absolute 0.00 10*3/mm3      nRBC 0.3 /100 WBC                 Imaging Results (Last 72 Hours)       ** No results found for the last 72 hours. **              Medication Review:      Hospital Medications (active)         Dose Frequency Start End    bisacodyl (DULCOLAX) EC tablet 5 mg ([MAR Hold] since 6/4/2023  1:12 PM) 5 mg Daily PRN 6/4/2023     Admin Instructions: Use if no bowel movement after 12 hours.  Swallow whole. Do not crush, split, or chew tablet.    Route: Oral    Linked Group 1: See Hyperspace for full Linked Orders Report.        bisacodyl (DULCOLAX) suppository 10 mg ([MAR Hold] since 6/4/2023  1:12 PM) 10 mg Daily PRN 6/4/2023     Admin Instructions: Use if no bowel movement after 12 hours.  Hold for diarrhea    Route: Rectal    Linked Group 1: See Hyperspace for full Linked Orders Report.        clindamycin (CLEOCIN) 600 mg in sodium chloride 0.9% 50 mL IVPB (premix) 600 mg Every 8 Hours 6/4/2023 6/11/2023    Admin Instructions: Do Not refrigerate.    Route: Intravenous    Enoxaparin Sodium (LOVENOX) syringe 40 mg ([MAR Hold] since 6/4/2023  1:12 PM) 40 mg Every 12 Hours Scheduled 6/4/2023     Admin Instructions: Give subcutaneous in abdomen only. Do not massage site after injection.    Route: Subcutaneous    fentaNYL citrate (PF) (SUBLIMAZE) injection 50 mcg 50 mcg Every 15 Minutes PRN 6/4/2023     Admin Instructions: Maximum total dose of fentaNYL is 100 mcg. Based on patient request - if ordered for moderate or severe pain, provider allows for  "administration of a medication prescribed for a lower pain scale.  If given for pain, use the following pain scale:  Mild Pain = Pain Score of 1-3, CPOT 1-2  Moderate Pain = Pain Score of 4-6, CPOT 3-4  Severe Pain = Pain Score of 7-10, CPOT 5-8    Route: Intravenous    HYDROmorphone (DILAUDID) injection 0.5 mg 0.5 mg Every 15 Minutes PRN 6/4/2023     Admin Instructions: Maximum total dose of HYDROmorphone is 2 mg. Based on patient request - if ordered for moderate or severe pain, provider allows for administration of a medication prescribed for a lower pain scale.      Caution: Look alike/sound alike drug alert    If given for pain, use the following pain scale:  Mild Pain = Pain Score of 1-3, CPOT 1-2  Moderate Pain = Pain Score of 4-6, CPOT 3-4  Severe Pain = Pain Score of 7-10, CPOT 5-8    Route: Intravenous    naloxone (NARCAN) injection 0.4 mg 0.4 mg As Needed 6/4/2023     Route: Intravenous    ondansetron (ZOFRAN) injection 4 mg ([MAR Hold] since 6/4/2023  1:12 PM) 4 mg Every 6 Hours PRN 6/4/2023     Admin Instructions: \"If multiple N/V medications ordered, use in the following order: Ondansetron, Prochlorperazine, Promethazine. Use PO unless patient refuses or patient unable to swallow.\"      Route: Intravenous    polyethylene glycol (MIRALAX) packet 17 g ([MAR Hold] since 6/4/2023  1:12 PM) 17 g Daily PRN 6/4/2023     Admin Instructions: Use if no bowel movement after 12 hours. Mix in 6-8 ounces of water.  Use 4-8 ounces of water, tea, or juice for each 17 gram dose.    Route: Oral    Linked Group 1: See Hyperspace for full Linked Orders Report.        sennosides-docusate (PERICOLACE) 8.6-50 MG per tablet 2 tablet ([MAR Hold] since 6/4/2023  1:12 PM) 2 tablet 2 Times Daily 6/4/2023     Admin Instructions: HOLD MEDICATION IF PATIENT HAS HAD BOWEL MOVEMENT. Start bowel management regimen if patient has not had a bowel movement after 12 hours.    Route: Oral    Linked Group 1: See Hyperspace for full Linked " Orders Report.        sodium chloride 0.9 % flush 10 mL ([MAR Hold] since 6/4/2023  1:12 PM) 10 mL Every 12 Hours Scheduled 6/4/2023     Route: Intravenous    sodium chloride 0.9 % flush 10 mL ([MAR Hold] since 6/4/2023  1:12 PM) 10 mL As Needed 6/4/2023     Route: Intravenous    sodium chloride 0.9 % infusion 40 mL ([MAR Hold] since 6/4/2023  1:12 PM) 40 mL As Needed 6/4/2023     Admin Instructions: Following administration of an IV intermittent medication, flush line with 40mL NS at 100mL/hr.    Route: Intravenous            Assessment & Plan         Scrotal abscess      S/P I&D    Plan :    IV clinda  I&D  C&S  Will follow  Thank you     Reilly Calderon MD  06/04/23  14:45 EDT

## 2023-06-04 NOTE — ANESTHESIA PROCEDURE NOTES
Airway  Urgency: elective    Date/Time: 6/4/2023 1:44 PM  Airway not difficult    General Information and Staff    Patient location during procedure: OR  CRNA/CAA: Sima Tobar CRNA    Indications and Patient Condition  Indications for airway management: airway protection    Preoxygenated: yes  MILS maintained throughout  Mask difficulty assessment: 0 - not attempted    Final Airway Details  Final airway type: endotracheal airway      Successful airway: ETT  Cuffed: yes   Successful intubation technique: video laryngoscopy  Facilitating devices/methods: intubating stylet  Endotracheal tube insertion site: oral  Blade: Kahn  Blade size: 4  ETT size (mm): 7.0  Cormack-Lehane Classification: grade I - full view of glottis  Placement verified by: chest auscultation and capnometry   Cuff volume (mL): 8  Measured from: gums  ETT/EBT to gums (cm): 22  Number of attempts at approach: 1  Assessment: lips, teeth, and gum same as pre-op and atraumatic intubation    Additional Comments  Preoxygenation, smooth IV induction. EMV with oral airway. Head/neck neutral during induction/intubation.

## 2023-06-04 NOTE — ANESTHESIA PREPROCEDURE EVALUATION
Anesthesia Evaluation     Patient summary reviewed and Nursing notes reviewed   NPO Solid Status: > 8 hours  NPO Liquid Status: > 8 hours           Airway   Mallampati: II  TM distance: >3 FB  Neck ROM: full  No difficulty expected  Dental    (+) edentulous    Pulmonary    (+) a smoker Current Abstained day of surgery,shortness of breath, wheezes  Cardiovascular     Rhythm: regular  Rate: normal        Neuro/Psych  GI/Hepatic/Renal/Endo      Musculoskeletal     Abdominal    Substance History      OB/GYN          Other        ROS/Med Hx Other: Hasn't been to the doctor in >10 years.     Smokes 2 packs a day.                  Anesthesia Plan    ASA 3     general     intravenous induction     Anesthetic plan, risks, benefits, and alternatives have been provided, discussed and informed consent has been obtained with: patient.    CODE STATUS:    Level Of Support Discussed With: Patient  Code Status (Patient has no pulse and is not breathing): CPR (Attempt to Resuscitate)  Medical Interventions (Patient has pulse or is breathing): Full Support

## 2023-06-04 NOTE — OP NOTE
INCISION AND DRAINAGE WOUND  Procedure Report    Patient Name:  Godwin Gamez Jr.  YOB: 1960    Date of Surgery:  6/4/2023     Indications: Scrotal abscess    Pre-op Diagnosis:   Right Scrotal abscess    Post-Op Diagnosis:     Post-Op Diagnosis Codes:  Same    Procedure/CPT® Codes:      Procedure(s):  Excision of right scrotal abscess    Staff:  Surgeon(s):  Cristian Vaughn MD            was responsible for performing the following activities: Irrigation and their skilled assistance was necessary for the success of this case.    Anesthesia: General    Estimated Blood Loss: minimal    Implants:    Nothing was implanted during the procedure    Specimen:          ID Type Source Tests Collected by Time   1 (Not marked as sent) : SCROTAL ABSCESS RIGHT CULTURE SWAB Wound Scrotum ANAEROBIC CULTURE, WOUND CULTURE Cristian Vaughn MD 6/4/2023 1356   A (Not marked as sent) : SCROTAL TISSUE Tissue Scrotum TISSUE PATHOLOGY EXAM Cristian Vaughn MD 6/4/2023 1405         Findings: Scrotal abscess excised.  Patient will undergo dressing changes and antibiotics     Complications: None    Description of Procedure: Patient was taken to the operating room laid in the supine position where general endotracheal anesthesia was induced.  Then he is placed in a comfortable dorsal thigh position where his groin area was prepped and draped in usual sterile fashion.  He had obvious pus draining from his right hemiscrotum.  This was sent for culture.  Once his area of been prepped and draped usual sterile fashion we made an incision in this area.  We obtained elio pus.  We washed out the incision.  We then used a Bovie cautery to surgically excise all of the abscessed tissue.  It did not involve the right testicle or the left testicle.  We packed the wound with Betadine soaked dressings.  We will begin normal saline wet-to-dry dressings in the a.m.  Patient's wife is willing to learn how to do dressing  changes      Cristian Vaughn MD     Date: 6/4/2023  Time: 14:17 EDT

## 2023-06-05 PROCEDURE — 25010000002 ENOXAPARIN PER 10 MG: Performed by: UROLOGY

## 2023-06-05 PROCEDURE — 25010000002 HYDROMORPHONE 1 MG/ML SOLUTION: Performed by: UROLOGY

## 2023-06-05 PROCEDURE — 25010000002 MORPHINE PER 10 MG: Performed by: UROLOGY

## 2023-06-05 RX ADMIN — SENNOSIDES AND DOCUSATE SODIUM 2 TABLET: 50; 8.6 TABLET ORAL at 09:24

## 2023-06-05 RX ADMIN — CLINDAMYCIN PHOSPHATE 600 MG: 600 INJECTION, SOLUTION INTRAVENOUS at 18:09

## 2023-06-05 RX ADMIN — CLINDAMYCIN PHOSPHATE 600 MG: 600 INJECTION, SOLUTION INTRAVENOUS at 02:27

## 2023-06-05 RX ADMIN — HYDROCODONE BITARTRATE AND ACETAMINOPHEN 2 TABLET: 7.5; 325 TABLET ORAL at 06:01

## 2023-06-05 RX ADMIN — HYDROCODONE BITARTRATE AND ACETAMINOPHEN 2 TABLET: 7.5; 325 TABLET ORAL at 20:06

## 2023-06-05 RX ADMIN — CLINDAMYCIN PHOSPHATE 600 MG: 600 INJECTION, SOLUTION INTRAVENOUS at 09:43

## 2023-06-05 RX ADMIN — SENNOSIDES AND DOCUSATE SODIUM 2 TABLET: 50; 8.6 TABLET ORAL at 20:06

## 2023-06-05 RX ADMIN — Medication 10 ML: at 20:02

## 2023-06-05 RX ADMIN — HYDROMORPHONE HYDROCHLORIDE 0.5 MG: 1 INJECTION, SOLUTION INTRAMUSCULAR; INTRAVENOUS; SUBCUTANEOUS at 09:20

## 2023-06-05 RX ADMIN — ENOXAPARIN SODIUM 40 MG: 100 INJECTION SUBCUTANEOUS at 09:20

## 2023-06-05 RX ADMIN — ACETAMINOPHEN 650 MG: 325 TABLET, FILM COATED ORAL at 21:28

## 2023-06-05 RX ADMIN — HYDROCODONE BITARTRATE AND ACETAMINOPHEN 2 TABLET: 7.5; 325 TABLET ORAL at 14:21

## 2023-06-05 RX ADMIN — ENOXAPARIN SODIUM 40 MG: 100 INJECTION SUBCUTANEOUS at 20:02

## 2023-06-05 RX ADMIN — MORPHINE SULFATE 4 MG: 4 INJECTION, SOLUTION INTRAMUSCULAR; INTRAVENOUS at 18:09

## 2023-06-05 RX ADMIN — Medication 10 ML: at 09:24

## 2023-06-05 RX ADMIN — NICOTINE 1 PATCH: 21 PATCH, EXTENDED RELEASE TRANSDERMAL at 09:24

## 2023-06-05 RX ADMIN — SODIUM CHLORIDE 100 ML/HR: 9 INJECTION, SOLUTION INTRAVENOUS at 23:47

## 2023-06-05 NOTE — CASE MANAGEMENT/SOCIAL WORK
Social Work Assessment  [facility]     Patient Name: Godwin Gamez Jr.  MRN: [id number]  Today's Date: 6/5/2023    Admit Date: 6/3/2023     Demographic Summary       Row Name 06/05/23 9035       General Information    Reason for Consult insurance concerns;financial concerns;health care directive    General Information Comments SW noted pt is listed without insurance. Per MA notes, pt is not eligible for HPE and will f/u. SW met with pt at bedside, but he requested this writer speak with his wife Netta, who was on the phone. Pt does not have a PCP and is not on home meds. 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 He reports having a car for transportation but is worried about whether he will continue having income to pay for it. Pt is agreeable for a PCP appt to be made at [facility] in [place], [place] - info placed on AVS. Pt reports his wife is his POA and she will bring document this evening when she visits - nurse notified. Pt thanked this writer and denies further needs at this time.             Delilah Mares, MARK, [facility]  Medical Social Worker  Ph [phone]  Fax [phone]  [email or web address]

## 2023-06-05 NOTE — PROGRESS NOTES
Urology Progress Note    Patient Identification:  Name:  Godwin Gamez Jr.  Age:  62 y.o.  Sex:  male  :  1960  MRN:  [id number]    Chief Complaint: Patient wants to go home    History of Present Illness: Patient is status post excision and drainage of right scrotal abscess.  He is still having some drainage overnight but is feeling significantly better and wishes to go home    Problem List:    Scrotal abscess     Past Medical History:  History reviewed. No pertinent past medical history.  Past Surgical History:  History reviewed. No pertinent surgical history.  Home Meds:  No medications prior to admission.     Current Meds:    Current Facility-Administered Medications:     acetaminophen (TYLENOL) tablet 650 mg, 650 mg, Oral, Q4H PRN **OR** acetaminophen (TYLENOL) suppository 650 mg, 650 mg, Rectal, Q4H PRN, Cristian Vaughn MD    sennosides-docusate (PERICOLACE) 8.6-50 MG per tablet 2 tablet, 2 tablet, Oral, BID, 2 tablet at 23 **AND** polyethylene glycol (MIRALAX) packet 17 g, 17 g, Oral, Daily PRN **AND** bisacodyl (DULCOLAX) EC tablet 5 mg, 5 mg, Oral, Daily PRN **AND** bisacodyl (DULCOLAX) suppository 10 mg, 10 mg, Rectal, Daily PRN, Cristian Vaughn MD    clindamycin (CLEOCIN) 600 mg in sodium chloride 0.9% 50 mL IVPB (premix), 600 mg, Intravenous, Q8H, Cristian Vaughn MD, 600 mg at 23    Enoxaparin Sodium (LOVENOX) syringe 40 mg, 40 mg, Subcutaneous, Q12H, Cristian Vaughn MD, 40 mg at 23    fentaNYL citrate (PF) (SUBLIMAZE) injection 50 mcg, 50 mcg, Intravenous, Q15 Min PRN, Cristain Vaughn MD    HYDROcodone-acetaminophen (NORCO) 7.5-325 MG per tablet 2 tablet, 2 tablet, Oral, Q4H PRN, Cristian Vaughn MD, 2 tablet at 23    HYDROmorphone (DILAUDID) injection 0.5 mg, 0.5 mg, Intravenous, Q15 Min PRN, Cristian Vaughn MD    HYDROmorphone (DILAUDID) injection 0.5 mg, 0.5 mg, Intravenous, Q2H PRN, 0.5 mg at 23 5625 **AND** naloxone  "(NARCAN) injection 0.1 mg, 0.1 mg, Intravenous, Q5 Min PRN, Cristian Vaughn MD    morphine injection 4 mg, 4 mg, Intravenous, Q2H PRN **AND** naloxone (NARCAN) injection 0.4 mg, 0.4 mg, Intravenous, Q5 Min PRN, Cristian Vaughn MD    naloxone (NARCAN) injection 0.4 mg, 0.4 mg, Intravenous, PRN, Cristian Vaughn MD    nicotine (NICODERM CQ) 21 MG/24HR patch 1 patch, 1 patch, Transdermal, Q24H, Isak Bailon, , 1 patch at 23 1901    ondansetron (ZOFRAN) tablet 4 mg, 4 mg, Oral, Q6H PRN **OR** ondansetron (ZOFRAN) injection 4 mg, 4 mg, Intravenous, Q6H PRN, Cristian Vaughn MD    sodium chloride 0.9 % flush 10 mL, 10 mL, Intravenous, Q12H, Cristian Vaughn MD, 10 mL at 23 0857    sodium chloride 0.9 % flush 10 mL, 10 mL, Intravenous, PRN, Cristian Vaughn MD    sodium chloride 0.9 % infusion 40 mL, 40 mL, Intravenous, PRN, Cristian Vaughn MD    sodium chloride 0.9 % infusion, 100 mL/hr, Intravenous, Continuous, Cristian Vaughn MD, Last Rate: 100 mL/hr at 23, 100 mL/hr at 23  Allergies:  Valium [diazepam] and Penicillins    Review of Systems     Objective:  tMax 24 hours:  Temp (24hrs), Av.9 °F (36.6 °C), Min:97.3 °F (36.3 °C), Max:98.7 °F (37.1 °C)    Vital Sign Ranges:  Temp:  [97.3 °F (36.3 °C)-98.7 °F (37.1 °C)] 97.5 °F (36.4 °C)  Heart Rate:  [] 58  Resp:  [10-26] 13  BP: (109-152)/(61-95) 109/61  Intake and Output Last 3 Shifts:  I/O last 3 completed shifts:  In: 400 [I.V.:400]  Out: 1350 [Urine:1350]    Exam:  /61 (BP Location: Right arm, Patient Position: Lying)   Pulse 58   Temp 97.5 °F (36.4 °C) (Oral)   Resp 13   Ht 182.9 cm (72\")   Wt 130 kg (286 lb 9.6 oz)   SpO2 93%   BMI 38.87 kg/m²    General Appearance:    Alert, cooperative, no acute distress, general         appearance is normal   Head:    Normocephalic, without obvious abnormality, atraumatic   Eyes:            Pupils/Irises normal. Exterior inspection conjunctivae       " and lids normal.   Ears:    Normal external inspection   Nose:   Exterior inspection of nose is normal   Throat:   Lips, mucosa, and tongue normal   Lungs:     Respirations unlabored; normal effort, no audible     abnormality   CV:   Regular rhythm and normal rate, no edema   Abdomen:     examination of the abdomen is normal with     no masses, tenderness, or distension    : Large scrotal abscess cavity with packing in place.  No purulence is noted at this time.  There is small amount of blood on the dressings     Data Review:  All labs (24hrs):    Lab Results (last 24 hours)       Procedure Component Value Units Date/Time    Tissue Pathology Exam [[id number]] Collected: 06/04/23 1405    Specimen: Tissue from Scrotum Updated: 06/05/23 0704    Wound Culture - Wound, Scrotum [[id number]] Collected: 06/04/23 1356    Specimen: Wound from Scrotum Updated: 06/04/23 1516     Gram Stain Many (4+) WBCs seen      Many (4+) Gram positive cocci    Anaerobic Culture - Wound, Scrotum [[id number]] Collected: 06/04/23 1356    Specimen: Wound from Scrotum Updated: 06/04/23 1403          Radiology:   Imaging Results (Last 72 Hours)       ** No results found for the last 72 hours. **            Assessment/Plan:    Principal Problem:    Scrotal abscess    Scrotal abscess status post excision and drainage    Plan  Okay for discharge from urology standpoint  Continue antibiotics per primary  Patient's wife to learn dressing changes from nurses will continue to do these twice a day  Follow-up with the urology office in [place] in 1 week      Mohit Cooper MD  6/5/2023  08:07 EDT

## 2023-06-05 NOTE — DISCHARGE INSTR - OTHER ORDERS
[facility]  [address] [place] [address]  [phone]    Appointment: Monday, June 12, 2023 at 2:00p.  Bring your ID and hospital discharge paperwork.

## 2023-06-05 NOTE — PROGRESS NOTES
LOS: 2 days   Patient Care Team:  Provider, No Known as PCP - General        Subjective     Interval History:     Patient Complaints:   Patient Denies:  NV       Review of Systems:    Pain    Objective     Vital Signs  Temp:  [97.3 °F (36.3 °C)-98.7 °F (37.1 °C)] 97.4 °F (36.3 °C)  Heart Rate:  [] 67  Resp:  [12-17] 14  BP: (104-151)/(54-95) 108/54    Physical Exam:     General Appearance:    Alert, cooperative, in no acute distress   Head:    Normocephalic, without obvious abnormality, atraumatic   Eyes:            Lids and lashes normal, conjunctivae and sclerae normal, no   icterus, no pallor, corneas clear, PERRLA   Ears:    Ears appear intact with no abnormalities noted   Throat:   No oral lesions, no thrush, oral mucosa moist   Neck:   No adenopathy, supple, trachea midline, no thyromegaly, no   carotid bruit, no JVD   Back:     No kyphosis present, no scoliosis present, no skin lesions,      erythema or scars, no tenderness to percussion or                   palpation,   range of motion normal   Lungs:     Clear to auscultation,respirations regular, even and                  unlabored    Heart:    Regular rhythm and normal rate, normal S1 and S2, no            murmur, no gallop, no rub, no click   Chest Wall:    No abnormalities observed   Abdomen:     Normal bowel sounds, no masses, no organomegaly, soft        non-tender, non-distended, no guarding, no rebound                tenderness   Rectal:     Deferred   Extremities:   Moves all extremities well, no edema, no cyanosis, no             redness   Pulses:   Pulses palpable and equal bilaterally   Skin:   No bleeding, bruising or rash   Lymph nodes:   No palpable adenopathy   Neurologic:   Cranial nerves 2 - 12 grossly intact, sensation intact, DTR       present and equal bilaterally          Results Review:      Lab Results (last 72 hours)       Procedure Component Value Units Date/Time    Wound Culture - Wound, Scrotum [[id number]] Collected:  06/04/23 1356    Specimen: Wound from Scrotum Updated: 06/05/23 1250     Wound Culture Culture in progress     Gram Stain Many (4+) WBCs seen      Many (4+) Gram positive cocci    Tissue Pathology Exam [[id number]] Collected: 06/04/23 1405    Specimen: Tissue from Scrotum Updated: 06/05/23 0704    Anaerobic Culture - Wound, Scrotum [[id number]] Collected: 06/04/23 1356    Specimen: Wound from Scrotum Updated: 06/04/23 1403    Basic Metabolic Panel [[id number]]  (Abnormal) Collected: 06/04/23 0400    Specimen: Blood Updated: 06/04/23 0442     Glucose 139 mg/dL      BUN 12 mg/dL      Creatinine 0.88 mg/dL      Sodium 138 mmol/L      Potassium 3.9 mmol/L      Chloride 101 mmol/L      CO2 25.0 mmol/L      Calcium 8.3 mg/dL      BUN/Creatinine Ratio 13.6     Anion Gap 12.0 mmol/L      eGFR 98.4 mL/min/1.73     Narrative:      GFR Normal >60  Chronic Kidney Disease <60  Kidney Failure <15      CBC & Differential [[id number]]  (Abnormal) Collected: 06/04/23 0400    Specimen: Blood Updated: 06/04/23 0417    Narrative:      The following orders were created for panel order CBC & Differential.  Procedure                               Abnormality         Status                     ---------                               -----------         ------                     CBC Auto Differential[[id number]]        Abnormal            Final result                 Please view results for these tests on the individual orders.    CBC Auto Differential [[id number]]  (Abnormal) Collected: 06/04/23 0400    Specimen: Blood Updated: 06/04/23 0417     WBC 8.90 10*3/mm3      RBC 4.70 10*6/mm3      Hemoglobin 14.9 g/dL      Hematocrit 42.9 %      MCV 91.3 fL      MCH 31.7 pg      MCHC 34.7 g/dL      RDW 14.0 %      RDW-SD 47.3 fl      MPV 9.0 fL      Platelets 172 10*3/mm3      Neutrophil % 71.2 %      Lymphocyte % 17.6 %      Monocyte % 9.4 %      Eosinophil % 1.3 %      Basophil % 0.5 %      Neutrophils, Absolute 6.30 10*3/mm3      Lymphocytes,  Absolute 1.60 10*3/mm3      Monocytes, Absolute 0.80 10*3/mm3      Eosinophils, Absolute 0.10 10*3/mm3      Basophils, Absolute 0.00 10*3/mm3      nRBC 0.3 /100 WBC             Imaging Results (Last 72 Hours)       ** No results found for the last 72 hours. **              Medication Review:     Hospital Medications (active)         Dose Frequency Start End    acetaminophen (TYLENOL) suppository 650 mg 650 mg Every 4 Hours PRN 6/4/2023     Admin Instructions: Do not exceed 4 grams of acetaminophen in a 24 hr period.    If given for pain, use the following pain scale:   Mild Pain = Pain Score of 1-3, CPOT 1-2  Moderate Pain = Pain Score of 4-6, CPOT 3-4  Severe Pain = Pain Score of 7-10, CPOT 5-8  Do not exceed 4 grams of acetaminophen in a 24 hr period. Max dose of 2gm for AST/ALT greater than 120 units/L    If given for fever, use fever parameter: fever greater than 100.4 °F.    If given for pain, use the following pain scale:   Mild Pain = Pain Score of 1-3, CPOT 1-2  Moderate Pain = Pain Score of 4-6, CPOT 3-4  Severe Pain = Pain Score of 7-10, CPOT 5-8    Route: Rectal    Linked Group 1: See Hyperspace for full Linked Orders Report.        acetaminophen (TYLENOL) tablet 650 mg 650 mg Every 4 Hours PRN 6/4/2023     Admin Instructions: Do not exceed 4 grams of acetaminophen in a 24 hr period.    If given for pain, use the following pain scale:   Mild Pain = Pain Score of 1-3, CPOT 1-2  Moderate Pain = Pain Score of 4-6, CPOT 3-4  Severe Pain = Pain Score of 7-10, CPOT 5-8  Do not exceed 4 grams of acetaminophen in a 24 hr period. Max dose of 2gm for AST/ALT greater than 120 units/L    If given for fever, use fever parameter: fever greater than 100.4 °F.    If given for pain, use the following pain scale:   Mild Pain = Pain Score of 1-3, CPOT 1-2  Moderate Pain = Pain Score of 4-6, CPOT 3-4  Severe Pain = Pain Score of 7-10, CPOT 5-8    Route: Oral    Linked Group 1: See Hyperspace for full Linked Orders Report.         bisacodyl (DULCOLAX) EC tablet 5 mg 5 mg Daily PRN 6/4/2023     Admin Instructions: Use if no bowel movement after 12 hours.  Swallow whole. Do not crush, split, or chew tablet.    Route: Oral    Linked Group 2: See Hyperspace for full Linked Orders Report.        bisacodyl (DULCOLAX) suppository 10 mg 10 mg Daily PRN 6/4/2023     Admin Instructions: Use if no bowel movement after 12 hours.  Hold for diarrhea    Route: Rectal    Linked Group 2: See Hyperspace for full Linked Orders Report.        clindamycin (CLEOCIN) 600 mg in sodium chloride 0.9% 50 mL IVPB (premix) 600 mg Every 8 Hours 6/4/2023 6/11/2023    Admin Instructions: Do Not refrigerate.    Route: Intravenous    Enoxaparin Sodium (LOVENOX) syringe 40 mg 40 mg Every 12 Hours Scheduled 6/4/2023     Admin Instructions: Give subcutaneous in abdomen only. Do not massage site after injection.    Route: Subcutaneous    fentaNYL citrate (PF) (SUBLIMAZE) injection 50 mcg 50 mcg Every 15 Minutes PRN 6/4/2023     Admin Instructions: Maximum total dose of fentaNYL is 100 mcg. Based on patient request - if ordered for moderate or severe pain, provider allows for administration of a medication prescribed for a lower pain scale.  If given for pain, use the following pain scale:  Mild Pain = Pain Score of 1-3, CPOT 1-2  Moderate Pain = Pain Score of 4-6, CPOT 3-4  Severe Pain = Pain Score of 7-10, CPOT 5-8    Route: Intravenous    HYDROcodone-acetaminophen (NORCO) 7.5-325 MG per tablet 2 tablet 2 tablet Every 4 Hours PRN 6/4/2023 6/14/2023    Admin Instructions: [AIME]    Do not exceed 4 grams of acetaminophen in a 24 hr period.    If given for pain, use the following pain scale:   Mild Pain = Pain Score of 1-3, CPOT 1-2  Moderate Pain = Pain Score of 4-6, CPOT 3-4  Severe Pain = Pain Score of 7-10, CPOT 5-8  [AIME]    Do not exceed 4 grams of acetaminophen in a 24 hr period. Max dose of 2gm for AST/ALT greater than 120 units/L        If given for pain, use the  following pain scale:   Mild Pain = Pain Score of 1-3, CPOT 1-2  Moderate Pain = Pain Score of 4-6, CPOT 3-4  Severe Pain = Pain Score of 7-10, CPOT 5-8    Route: Oral    HYDROmorphone (DILAUDID) injection 0.5 mg 0.5 mg Every 15 Minutes PRN 6/4/2023     Admin Instructions: Maximum total dose of HYDROmorphone is 2 mg. Based on patient request - if ordered for moderate or severe pain, provider allows for administration of a medication prescribed for a lower pain scale.      Caution: Look alike/sound alike drug alert    If given for pain, use the following pain scale:  Mild Pain = Pain Score of 1-3, CPOT 1-2  Moderate Pain = Pain Score of 4-6, CPOT 3-4  Severe Pain = Pain Score of 7-10, CPOT 5-8    Route: Intravenous    HYDROmorphone (DILAUDID) injection 0.5 mg 0.5 mg Every 2 Hours PRN 6/4/2023 6/11/2023    Admin Instructions: Based on patient request - if ordered for moderate or severe pain, provider allows for administration of a medication prescribed for a lower pain scale.      Caution: Look alike/sound alike drug alert    If given for pain, use the following pain scale:  Mild Pain = Pain Score of 1-3, CPOT 1-2  Moderate Pain = Pain Score of 4-6, CPOT 3-4  Severe Pain = Pain Score of 7-10, CPOT 5-8    Route: Intravenous    Linked Group 3: See Hyperspace for full Linked Orders Report.        morphine injection 4 mg 4 mg Every 2 Hours PRN 6/4/2023 6/14/2023    Admin Instructions: If given for pain, use the following pain scale:  Mild Pain = Pain Score of 1-3, CPOT 1-2  Moderate Pain = Pain Score of 4-6, CPOT 3-4  Severe Pain = Pain Score of 7-10, CPOT 5-8    Route: Intravenous    Linked Group 4: See Hyperspace for full Linked Orders Report.        naloxone (NARCAN) injection 0.1 mg 0.1 mg Every 5 Minutes PRN 6/4/2023     Admin Instructions: If respiratory rate is less than 8 breaths/minute or patient is difficult to arouse stop any narcotics and contact physician. Administer slow IV push. Repeat as ordered until  patient's respiratory rate is greater than 12 breaths/minute.    Route: Intravenous    Linked Group 3: See Hyperspace for full Linked Orders Report.        naloxone (NARCAN) injection 0.4 mg 0.4 mg As Needed 6/4/2023     Route: Intravenous    naloxone (NARCAN) injection 0.4 mg 0.4 mg Every 5 Minutes PRN 6/4/2023     Admin Instructions: If respiratory rate is less than 8 breaths/minute or patient is difficult to arouse stop any narcotics and contact physician.   Administer slow IV push. Repeat as ordered until patient's respiratory rate is greater than 12 breaths/minute.    Route: Intravenous    Linked Group 4: See Hyperspace for full Linked Orders Report.        nicotine (NICODERM CQ) 21 MG/24HR patch 1 patch 1 patch Every 24 Hours Scheduled 6/4/2023     Admin Instructions: Apply to clean, dry, nonhairy area of skin (typically upper arm or shoulder)   Acutely Hazardous. Waste BOTH Residual Medication and/or Empty Package.    Route: Transdermal    ondansetron (ZOFRAN) injection 4 mg 4 mg Every 6 Hours PRN 6/4/2023     Admin Instructions: Give Zofran first. Give Phenergan if Zofran not effective. Then if Phenergan not effective, give metoclopramide.    Route: Intravenous    Linked Group 5: See Hyperspace for full Linked Orders Report.        ondansetron (ZOFRAN) tablet 4 mg 4 mg Every 6 Hours PRN 6/4/2023     Admin Instructions: Give Zofran first. Give Phenergan if Zofran not effective. Then if Phenergan not effective, give metoclopramide.    Route: Oral    Linked Group 5: See Hyperspace for full Linked Orders Report.        polyethylene glycol (MIRALAX) packet 17 g 17 g Daily PRN 6/4/2023     Admin Instructions: Use if no bowel movement after 12 hours. Mix in 6-8 ounces of water.  Use 4-8 ounces of water, tea, or juice for each 17 gram dose.    Route: Oral    Linked Group 2: See Hyperspace for full Linked Orders Report.        sennosides-docusate (PERICOLACE) 8.6-50 MG per tablet 2 tablet 2 tablet 2 Times Daily  6/4/2023     Admin Instructions: HOLD MEDICATION IF PATIENT HAS HAD BOWEL MOVEMENT. Start bowel management regimen if patient has not had a bowel movement after 12 hours.    Route: Oral    Linked Group 2: See Yovanny for full Linked Orders Report.        sodium chloride 0.9 % flush 10 mL 10 mL Every 12 Hours Scheduled 6/4/2023     Route: Intravenous    sodium chloride 0.9 % flush 10 mL 10 mL As Needed 6/4/2023     Route: Intravenous    sodium chloride 0.9 % infusion 40 mL 40 mL As Needed 6/4/2023     Admin Instructions: Following administration of an IV intermittent medication, flush line with 40mL NS at 100mL/hr.    Route: Intravenous    sodium chloride 0.9 % infusion 100 mL/hr Continuous 6/4/2023     Route: Intravenous          clindamycin, 600 mg, Intravenous, Q8H  enoxaparin, 40 mg, Subcutaneous, Q12H  nicotine, 1 patch, Transdermal, Q24H  senna-docusate sodium, 2 tablet, Oral, BID  sodium chloride, 10 mL, Intravenous, Q12H        Assessment & Plan         Scrotal abscess            S/P I&D     Plan :     IV clinda    I&D as needed  C&S  Home soon on 10 days of Clinda 300mg TID  Will follow  Thank you           Reilly Calderon MD  06/05/23  14:57 EDT

## 2023-06-05 NOTE — PLAN OF CARE
Goal Outcome Evaluation:                      VSS on room air. Pain treated per PRN PO/IV orders. Wound Care completed per order, pt's spouse assisted with morning dressing change. DC pending ID recommendations and wound cultures. Plan of care ongoing.

## 2023-06-05 NOTE — CASE MANAGEMENT/SOCIAL WORK
Discharge Planning Assessment  [facility]     Patient Name: Godwin Gamez Jr.  MRN: [id number]  Today's Date: 6/5/2023    Admit Date: 6/3/2023    Plan: Home. Family can transport patient at discharge,   Discharge Needs Assessment       Row Name 06/05/23 1646       Living Environment    People in Home spouse    Name(s) of People in Home graciela Chadwick    Current Living Arrangements home    Potentially Unsafe Housing Conditions none    Primary Care Provided by self    Provides Primary Care For no one    Family Caregiver if Needed spouse    Family Caregiver Names graciela Chadwick    Quality of Family Relationships supportive;involved;helpful    Able to Return to Prior Arrangements yes       Resource/Environmental Concerns    Resource/Environmental Concerns none    Transportation Concerns none       Transition Planning    Patient/Family Anticipates Transition to home with family    Patient/Family Anticipated Services at Transition none    Transportation Anticipated family or friend will provide       Discharge Needs Assessment    Readmission Within the Last 30 Days no previous admission in last 30 days    Equipment Currently Used at Home none    Concerns to be Addressed care coordination/care conferences;discharge planning    Anticipated Changes Related to Illness none    Equipment Needed After Discharge none    Provided Post Acute Provider List? N/A    N/A Provider List Comment denies dc needs                   Discharge Plan       Row Name 06/05/23 1647       Plan    Plan Home. Family can transport patient at discharge,    Patient/Family in Agreement with Plan yes    Plan Comments Met with patient at bedside.  Confirmed pharmacy.  Lives at home with spouse, who is able to transport patient at discharge.   Denies dc needs at this time.  Patient is uninsured, however may need some help with wound care or have his wife learn how to do the wound care to help him.  Pending wound culture,  IV abx                  Continued Care and  Services - Admitted Since 6/3/2023    Coordination has not been started for this encounter.       Expected Discharge Date and Time       Expected Discharge Date Expected Discharge Time    Jun 7, 2023            Demographic Summary       Row Name 06/05/23 1645       General Information    Admission Type inpatient    [facility] (see comments)  [facility]    Referral Source admission list    Reason for Consult discharge planning    Preferred Language English       Contact Information    Permission Granted to Share Info With       Row Name 06/05/23 9129       General Information    Reason for Consult insurance concerns;financial concerns;health care directive    General Information Comments SW noted pt is listed without insurance. Per MA notes, pt is not eligible for HPE and will f/u. SW met with pt at bedside, but he requested this writer speak with his wife Netta, who was on the phone. Pt does not have a PCP and is not on home meds. 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 He reports having a car for transportation but is worried about whether he will continue having income to pay for it. Pt is agreeable for a PCP appt to be made at [facility] in [place], [place] - info placed on AVS. Pt reports his wife is his POA and she will bring document this evening when she visits - nurse notified. Pt thanked this writer and denies further needs at this time.                   Functional Status       Row Name 06/05/23 2321       Functional Status    Usual Activity Tolerance good    Current Activity Tolerance good       Functional Status, IADL    Medications independent    Meal Preparation independent    Housekeeping independent    Laundry independent    Shopping independent       Mental Status    General Appearance WDL WDL       Mental Status Summary    Recent Changes in Mental Status/Cognitive Functioning no changes             Marisa Chávez RN  Met with patient in room wearing  PPE: mask, face shield/goggles, gloves, gown.      Maintained distance greater than six feet and spent less than 15 minutes in the room.

## 2023-06-05 NOTE — PLAN OF CARE
Goal Outcome Evaluation:  Plan of Care Reviewed With: patient, spouse      Pt is able to voice needs/concerns, VSS, ambulates with minimal assist, and has wife at bedside. Pain management utilized - see MAR. Pt has been up in chair and back to bed this shift. Betadine packing has stayed in place, but some drainage. Pt is addiment about leaving 06/05 and that wife will learn how to pack wound at home. DC home once cleared. Plan of care ongoing.

## 2023-06-05 NOTE — PROGRESS NOTES
[facility] Medicine Services   Daily Progress Note      Patient Name: Godwin Gamez Jr.  : 1960  MRN: [id number]  Primary Care Physician:  Provider, No Known  Date of admission: 6/3/2023      Subjective      Chief Complaint: Scrotal swelling and pain    Patient seen and examined this morning.  Doing well, feels significantly better.  Pain is well controlled.  Wants to go home when able.  Awaiting final culture results from I&D yesterday.    Pertinent positives as noted in HPI/subjective.  All other systems were reviewed and are negative.      Objective      Vitals:   Temp:  [97.3 °F (36.3 °C)-98.7 °F (37.1 °C)] 97.4 °F (36.3 °C)  Heart Rate:  [] 67  Resp:  [12-26] 14  BP: (104-152)/(54-95) 108/54  Flow (L/min):  [2-15] 2    Physical Exam:    General: Awake, alert, obese male, lying in bed, NAD  Eyes: PERRL, EOMI, conjunctivae are clear  Cardiovascular: Regular rate and rhythm, no murmurs  Respiratory: Clear to auscultation bilaterally, no wheezing or rales, unlabored breathing  Abdomen: Soft, nontender, positive bowel sounds, no guarding  Neurologic: A&O, CN grossly intact, moves all extremities spontaneously  Musculoskeletal: Normal range of motion, no other gross deformities  Skin: Warm, scrotal incision bandaged         Result Review    Result Review:  I have personally reviewed the results from the time of this admission to 2023 14:12 EDT and agree with these findings:  [x]  Laboratory  [x]  Microbiology  []  Radiology  []  EKG/Telemetry   []  Cardiology/Vascular   []  Pathology  []  Old records  []  Other:    Wounds (last 24 hours)       LDA Wound       Row Name 23 2048 23 1513 23 1458       Wound 23 1354 Right scrotum Incision    Wound - Properties Group Placement Date: 23  -CB Placement Time: 4  -CB Present on Hospital Admission: N  -CB Side: Right  -CB Location: scrotum  -CB Primary Wound Type: Incision  -CB    Dressing Appearance moist  drainage;intact  -KWAME -- --    Drainage Amount moderate  -KWAME -- --    Dressing Care -- packed with  betadine soaked gauze  -CH packed with;scrotal support  betadine soaked gauze  -CH    Retired Wound - Properties Group Placement Date: 06/04/23  -CB Placement Time: 1354  -CB Present on Hospital Admission: N  -CB Side: Right  -CB Location: scrotum  -CB Primary Wound Type: Incision  -CB    Retired Wound - Properties Group Date first assessed: 06/04/23  -CB Time first assessed: 1354  -CB Present on Hospital Admission: N  -CB Side: Right  -CB Location: scrotum  -CB Primary Wound Type: Incision  -CB              User Key  (r) = Recorded By, (t) = Taken By, (c) = Cosigned By      Initials Name Provider Type    Tiffany Franklin, RN Registered Nurse    Mary Fontenot RN Registered Nurse    Elsa Martinez LPN Licensed Nurse                      Assessment & Plan      Brief Patient Summary:  Godwin Gamez Jr. is a 62 y.o. male who presented to [facility] on 6/3/2023 from [facility] after he developed scrotal swelling for the last 3 to 4 weeks.  He said initially there was a small swelling with a spontaneous drainage, now over the last week or 2 the swelling continued to get worse, patient noted to have redness and firm abscess with no spontaneous drainage on right side of the scrotum was noted.  Patient was transferred here for further management.  Urology and ID consulted on admission.  Underwent I&D by urology on 6/4.  Remains on IV clindamycin per ID.      clindamycin, 600 mg, Intravenous, Q8H  enoxaparin, 40 mg, Subcutaneous, Q12H  nicotine, 1 patch, Transdermal, Q24H  senna-docusate sodium, 2 tablet, Oral, BID  sodium chloride, 10 mL, Intravenous, Q12H       sodium chloride, 100 mL/hr, Last Rate: 100 mL/hr (06/04/23 2202)         I have utilized all available, immediate resources to obtain, update, or review the patient's current medications including all prescriptions,  over-the-counter products, herbals, cannabis/cannabidiol products, and vitamin.mineral/dietary (nutritional) supplements.    Active Hospital Problems:  Active Hospital Problems    Diagnosis     **Scrotal abscess      Plan:     Scrotal abscess  -s/p I&D by urology on 6/4  -Await Intra-Op culture results  -Continue IV clindamycin  -ID following  -Urology signed off    Obesity  -BMI 38.8 noted  -Encourage weight loss    DVT prophylaxis  -Lovenox      CODE STATUS:    Level Of Support Discussed With: Patient  Code Status (Patient has no pulse and is not breathing): CPR (Attempt to Resuscitate)  Medical Interventions (Patient has pulse or is breathing): Full Support      Disposition: Home once cleared by ID    Electronically signed by Isak Bailon DO, 06/05/23, 14:12 EDT.  [facility] Hospitalist Team      Part of this note may be an electronic transcription/translation of spoken language to printed text using the Dragon Dictation System.

## 2023-06-06 ENCOUNTER — APPOINTMENT (OUTPATIENT)
Dept: OTHER | Facility: HOSPITAL | Age: 63
End: 2023-06-06
Payer: MEDICAID

## 2023-06-06 VITALS
HEART RATE: 62 BPM | WEIGHT: 286.6 LBS | SYSTOLIC BLOOD PRESSURE: 103 MMHG | TEMPERATURE: 98.1 F | DIASTOLIC BLOOD PRESSURE: 62 MMHG | OXYGEN SATURATION: 94 % | HEIGHT: 72 IN | BODY MASS INDEX: 38.82 KG/M2 | RESPIRATION RATE: 10 BRPM

## 2023-06-06 LAB
ANION GAP SERPL CALCULATED.3IONS-SCNC: 12 MMOL/L (ref 5–15)
BASOPHILS # BLD AUTO: 0 10*3/MM3 (ref 0–0.2)
BASOPHILS NFR BLD AUTO: 0.3 % (ref 0–1.5)
BUN SERPL-MCNC: 17 MG/DL (ref 8–23)
BUN/CREAT SERPL: 20.5 (ref 7–25)
CALCIUM SPEC-SCNC: 8.5 MG/DL (ref 8.6–10.5)
CHLORIDE SERPL-SCNC: 104 MMOL/L (ref 98–107)
CO2 SERPL-SCNC: 24 MMOL/L (ref 22–29)
CREAT SERPL-MCNC: 0.83 MG/DL (ref 0.76–1.27)
DEPRECATED RDW RBC AUTO: 47.3 FL (ref 37–54)
EGFRCR SERPLBLD CKD-EPI 2021: 100.2 ML/MIN/1.73
EOSINOPHIL # BLD AUTO: 0.1 10*3/MM3 (ref 0–0.4)
EOSINOPHIL NFR BLD AUTO: 0.8 % (ref 0.3–6.2)
ERYTHROCYTE [DISTWIDTH] IN BLOOD BY AUTOMATED COUNT: 14.5 % (ref 12.3–15.4)
GLUCOSE SERPL-MCNC: 146 MG/DL (ref 65–99)
HCT VFR BLD AUTO: 39.5 % (ref 37.5–51)
HGB BLD-MCNC: 13.4 G/DL (ref 13–17.7)
LAB AP CASE REPORT: NORMAL
LYMPHOCYTES # BLD AUTO: 2.4 10*3/MM3 (ref 0.7–3.1)
LYMPHOCYTES NFR BLD AUTO: 25.5 % (ref 19.6–45.3)
MCH RBC QN AUTO: 31.5 PG (ref 26.6–33)
MCHC RBC AUTO-ENTMCNC: 33.9 G/DL (ref 31.5–35.7)
MCV RBC AUTO: 92.9 FL (ref 79–97)
MONOCYTES # BLD AUTO: 0.6 10*3/MM3 (ref 0.1–0.9)
MONOCYTES NFR BLD AUTO: 6.3 % (ref 5–12)
NEUTROPHILS NFR BLD AUTO: 6.2 10*3/MM3 (ref 1.7–7)
NEUTROPHILS NFR BLD AUTO: 67.1 % (ref 42.7–76)
NRBC BLD AUTO-RTO: 0.1 /100 WBC (ref 0–0.2)
PATH REPORT.FINAL DX SPEC: NORMAL
PATH REPORT.GROSS SPEC: NORMAL
PLATELET # BLD AUTO: 214 10*3/MM3 (ref 140–450)
PMV BLD AUTO: 9.7 FL (ref 6–12)
POTASSIUM SERPL-SCNC: 4.1 MMOL/L (ref 3.5–5.2)
RBC # BLD AUTO: 4.25 10*6/MM3 (ref 4.14–5.8)
SODIUM SERPL-SCNC: 140 MMOL/L (ref 136–145)
WBC NRBC COR # BLD: 9.2 10*3/MM3 (ref 3.4–10.8)

## 2023-06-06 PROCEDURE — 25010000002 ENOXAPARIN PER 10 MG: Performed by: UROLOGY

## 2023-06-06 PROCEDURE — 85025 COMPLETE CBC W/AUTO DIFF WBC: CPT | Performed by: INTERNAL MEDICINE

## 2023-06-06 PROCEDURE — 80048 BASIC METABOLIC PNL TOTAL CA: CPT | Performed by: INTERNAL MEDICINE

## 2023-06-06 RX ORDER — CLINDAMYCIN HYDROCHLORIDE 300 MG/1
300 CAPSULE ORAL EVERY 8 HOURS SCHEDULED
Status: DISCONTINUED | OUTPATIENT
Start: 2023-06-06 | End: 2023-06-06 | Stop reason: HOSPADM

## 2023-06-06 RX ORDER — CLINDAMYCIN HYDROCHLORIDE 300 MG/1
300 CAPSULE ORAL EVERY 8 HOURS SCHEDULED
Qty: 30 CAPSULE | Refills: 0 | Status: SHIPPED | OUTPATIENT
Start: 2023-06-06 | End: 2023-06-16

## 2023-06-06 RX ORDER — CLINDAMYCIN HYDROCHLORIDE 300 MG/1
300 CAPSULE ORAL 3 TIMES DAILY
Qty: 21 CAPSULE | Refills: 0 | Status: SHIPPED | OUTPATIENT
Start: 2023-06-06 | End: 2023-06-06 | Stop reason: HOSPADM

## 2023-06-06 RX ORDER — HYDROXYZINE HYDROCHLORIDE 25 MG/1
25 TABLET, FILM COATED ORAL 3 TIMES DAILY PRN
Status: DISCONTINUED | OUTPATIENT
Start: 2023-06-06 | End: 2023-06-06 | Stop reason: HOSPADM

## 2023-06-06 RX ADMIN — HYDROCODONE BITARTRATE AND ACETAMINOPHEN 2 TABLET: 7.5; 325 TABLET ORAL at 08:41

## 2023-06-06 RX ADMIN — HYDROXYZINE HYDROCHLORIDE 25 MG: 25 TABLET, FILM COATED ORAL at 01:52

## 2023-06-06 RX ADMIN — HYDROCODONE BITARTRATE AND ACETAMINOPHEN 2 TABLET: 7.5; 325 TABLET ORAL at 14:43

## 2023-06-06 RX ADMIN — ENOXAPARIN SODIUM 40 MG: 100 INJECTION SUBCUTANEOUS at 09:56

## 2023-06-06 RX ADMIN — CLINDAMYCIN PHOSPHATE 600 MG: 600 INJECTION, SOLUTION INTRAVENOUS at 01:52

## 2023-06-06 RX ADMIN — CLINDAMYCIN PHOSPHATE 600 MG: 600 INJECTION, SOLUTION INTRAVENOUS at 09:56

## 2023-06-06 RX ADMIN — CLINDAMYCIN HYDROCHLORIDE 300 MG: 300 CAPSULE ORAL at 14:43

## 2023-06-06 RX ADMIN — NICOTINE 1 PATCH: 21 PATCH, EXTENDED RELEASE TRANSDERMAL at 09:56

## 2023-06-06 NOTE — ACP (ADVANCE CARE PLANNING)
Social Work Assessment  [facility]     Patient Name: Godwin Gamez Jr.  MRN: [id number]  Today's Date: 6/6/2023    Admit Date: 6/3/2023     Demographic Summary       Row Name 06/06/23 1608       General Information    Reason for Consult health care directive    General Information Comments SW was notified that pt's wife brought POA paperwork in but it does not cover healthcare - only financial. SW met with pt and his wife at bedside to discuss HCR. Pt declined to complete today (he is d/c'd and ready to leave), so blank HCR document was provided and pt's spouse reports they will complete once they get home.           MARK Roland, [facility]  Medical Social Worker  Ph [phone]  Fax [phone]  [email or web address]

## 2023-06-06 NOTE — DISCHARGE SUMMARY
[facility] Medicine Services   DISCHARGE SUMMARY    Patient Name: Godwin Gamez Jr.  : 1960  MRN: [id number]    Date of Admission: 6/3/2023  Date of Discharge:  23    Primary Care Physician: Provider, No Known      Presenting Problem:   Scrotal abscess [N49.2]    Active and Resolved Hospital Problems:  Active Hospital Problems    Diagnosis POA    **Scrotal abscess [N49.2] Yes      Resolved Hospital Problems   No resolved problems to display.         Hospital Course     Hospital Course:  Godwin Gamez Jr. is a 62 y.o. male who presented to [facility] on 6/3/2023 from [facility] after he developed scrotal swelling for the last 3 to 4 weeks.  He said initially there was a small swelling with a spontaneous drainage, now over the last week or 2 the swelling continued to get worse, patient noted to have redness and firm abscess with no spontaneous drainage on right side of the scrotum was noted.  Patient was transferred here for further management.  Urology and ID consulted on admission.  Underwent I&D by urology on .  Urology signed off and okay with discharge with outpatient follow-up.  Final culture results to be followed up by urology outpatient.  Case discussed with ID, recommending total of 10 days of p.o. clindamycin on discharge.  No further inpatient work-up required.  Patient feels much better and wants to go home today.  Patient is medically stable to discharge home with follow-up with PCP and urology as an outpatient.    A/P:    Scrotal abscess  -s/p I&D by urology on   -Urology signed off, final cultures to be followed up as outpatient per urology  -Treated with IV clindamycin, discussed with ID and okay to discharge on p.o. clindamycin for total of 10 days  -Wound care instructions given to patient and family  -[place] given on discharge per urology for pain  -Okay to discharge with outpatient follow-up with urology     Obesity  -BMI 38.8  noted  -Encourage weight loss      DISCHARGE Follow Up Recommendations for labs and diagnostics:   Follow-up with PCP and urology    Reasons For Change In Medications and Indications for New Medications:  Clindamycin for total of 10 days.  Norco as needed added per urology    Day of Discharge     Vital Signs:  Temp:  [97.3 °F (36.3 °C)-98.1 °F (36.7 °C)] 98.1 °F (36.7 °C)  Heart Rate:  [62-74] 62  Resp:  [10-15] 10  BP: (103-124)/(62-66) 103/62    Physical Exam:  General: Awake, alert, obese male, lying in bed, NAD  Eyes: PERRL, EOMI, conjunctivae are clear  Cardiovascular: Regular rate and rhythm, no murmurs  Respiratory: Clear to auscultation bilaterally, no wheezing or rales, unlabored breathing  Abdomen: Soft, nontender, positive bowel sounds, no guarding  Neurologic: A&O, CN grossly intact, moves all extremities spontaneously  Musculoskeletal: Normal range of motion, no other gross deformities  Skin: Warm, scrotal incision bandaged        Pertinent  and/or Most Recent Results     LAB RESULTS:      Lab 06/06/23  0103 06/04/23  0400   WBC 9.20 8.90   HEMOGLOBIN 13.4 14.9   HEMATOCRIT 39.5 42.9   PLATELETS 214 172   NEUTROS ABS 6.20 6.30   LYMPHS ABS 2.40 1.60   MONOS ABS 0.60 0.80   EOS ABS 0.10 0.10   MCV 92.9 91.3         Lab 06/06/23  0103 06/04/23  0400   SODIUM 140 138   POTASSIUM 4.1 3.9   CHLORIDE 104 101   CO2 24.0 25.0   ANION GAP 12.0 12.0   BUN 17 12   CREATININE 0.83 0.88   EGFR 100.2 98.4   GLUCOSE 146* 139*   CALCIUM 8.5* 8.3*                         Brief Urine Lab Results       None          Microbiology Results (last 10 days)       Procedure Component Value - Date/Time    Wound Culture - Wound, Scrotum [[id number]] Collected: 06/04/23 1356    Lab Status: Preliminary result Specimen: Wound from Scrotum Updated: 06/06/23 0978     Wound Culture Culture in progress     Gram Stain Many (4+) WBCs seen      Many (4+) Gram positive cocci                             Labs Pending at Discharge:  Pending Labs        Order Current Status    Anaerobic Culture - Wound, Scrotum In process    Wound Culture - Wound, Scrotum Preliminary result            Procedures Performed  Procedure(s):  EXCISION OF RIGHT SCROTAL ABSCESS WITH CULTURE     Findings: Scrotal abscess excised.  Patient will undergo dressing changes and antibiotics      Complications: None     Description of Procedure: Patient was taken to the operating room laid in the supine position where general endotracheal anesthesia was induced.  Then he is placed in a comfortable dorsal thigh position where his groin area was prepped and draped in usual sterile fashion.  He had obvious pus draining from his right hemiscrotum.  This was sent for culture.  Once his area of been prepped and draped usual sterile fashion we made an incision in this area.  We obtained elio pus.  We washed out the incision.  We then used a Bovie cautery to surgically excise all of the abscessed tissue.  It did not involve the right testicle or the left testicle.  We packed the wound with Betadine soaked dressings.  We will begin normal saline wet-to-dry dressings in the a.m.  Patient's wife is willing to learn how to do dressing changes        Cristian Vaughn MD      Date: 6/4/2023  Time: 14:17 EDT    Consults:   Consults       Date and Time Order Name Status Description    6/4/2023 12:46 AM Inpatient Infectious Diseases Consult Completed     6/4/2023 12:46 AM Inpatient Urology Consult Completed               Discharge Details        Discharge Medications        New Medications        Instructions Start Date   clindamycin 300 MG capsule  Commonly known as: CLEOCIN   300 mg, Oral, Every 8 Hours Scheduled      HYDROcodone-acetaminophen 7.5-325 MG per tablet  Commonly known as: Norco   1 tablet, Oral, Every 6 Hours PRN               Allergies   Allergen Reactions    Valium [Diazepam] Hallucinations    Penicillins Hives         Discharge Disposition:  Home or Self Care    Diet:  Hospital:  Diet  Order   Procedures    Diet: Regular/House Diet; Texture: Regular Texture (IDDSI 7); Fluid Consistency: Thin (IDDSI 0)         Discharge Activity:         CODE STATUS:  Code Status and Medical Interventions:   Ordered at: 06/04/23 0046     Level Of Support Discussed With:    Patient     Code Status (Patient has no pulse and is not breathing):    CPR (Attempt to Resuscitate)     Medical Interventions (Patient has pulse or is breathing):    Full Support         No future appointments.        Time spent on Discharge including face to face service:  35 minutes    Signature:    Electronically signed by Isak Bailon DO, 06/06/23, 1:26 PM EDT.      Part of this note may be an electronic transcription/translation of spoken language to printed text using the Dragon Dictation System.

## 2023-06-06 NOTE — PLAN OF CARE
Goal Outcome Evaluation:                      VSS on room air. Pain treated per PRN PO orders. Pt's wife performed wet to dry dressing change this morning with minimal verbal ques and assistance per this nurse. Presently awaiting culture results and additional recommendations per ID. Plan of care ongoing.

## 2023-06-06 NOTE — PLAN OF CARE
Goal Outcome Evaluation:   Pt c/o pain early in the shift. PRN oral pain med given. Pt at this time refuses ant care wants to be left alone. VSS  DC pending ID recommendations and wound culture. Plan of care ongoing

## 2023-06-06 NOTE — CASE MANAGEMENT/SOCIAL WORK
Case Management Discharge Note      Final Note: home    Provided Post Acute Provider List?: N/A  N/A Provider List Comment: denies dc needs    Selected Continued Care - Discharged on 6/6/2023 Admission date: 6/3/2023 - Discharge disposition: Home or Self Care                 Transportation Services  Private: Car    Final Discharge Disposition Code: 01 - home or self-care

## 2023-06-06 NOTE — PROGRESS NOTES
LOS: 3 days   Patient Care Team:  Provider, No Known as PCP - General        Subjective     Interval History:     Patient Complaints:   Patient Denies:  NV       Review of Systems:    Pain    Objective     Vital Signs  Temp:  [97.3 °F (36.3 °C)-98.1 °F (36.7 °C)] 98.1 °F (36.7 °C)  Heart Rate:  [62-74] 62  Resp:  [10-15] 10  BP: (103-124)/(62-66) 103/62    Physical Exam:     General Appearance:    Alert, cooperative, in no acute distress   Head:    Normocephalic, without obvious abnormality, atraumatic   Eyes:            Lids and lashes normal, conjunctivae and sclerae normal, no   icterus, no pallor, corneas clear, PERRLA   Ears:    Ears appear intact with no abnormalities noted   Throat:   No oral lesions, no thrush, oral mucosa moist   Neck:   No adenopathy, supple, trachea midline, no thyromegaly, no   carotid bruit, no JVD   Back:     No kyphosis present, no scoliosis present, no skin lesions,      erythema or scars, no tenderness to percussion or                   palpation,   range of motion normal   Lungs:     Clear to auscultation,respirations regular, even and                  unlabored    Heart:    Regular rhythm and normal rate, normal S1 and S2, no            murmur, no gallop, no rub, no click   Chest Wall:    No abnormalities observed   Abdomen:     Normal bowel sounds, no masses, no organomegaly, soft        non-tender, non-distended, no guarding, no rebound                tenderness   Rectal:     Deferred   Extremities:   Moves all extremities well, no edema, no cyanosis, no             redness   Pulses:   Pulses palpable and equal bilaterally   Skin:   No bleeding, bruising or rash   Lymph nodes:   No palpable adenopathy   Neurologic:   Cranial nerves 2 - 12 grossly intact, sensation intact, DTR       present and equal bilaterally          Results Review:      Lab Results (last 72 hours)       Procedure Component Value Units Date/Time    Wound Culture - Wound, Scrotum [[id number]] Collected:  06/04/23 1356    Specimen: Wound from Scrotum Updated: 06/05/23 1250     Wound Culture Culture in progress     Gram Stain Many (4+) WBCs seen      Many (4+) Gram positive cocci    Tissue Pathology Exam [[id number]] Collected: 06/04/23 1405    Specimen: Tissue from Scrotum Updated: 06/05/23 0704    Anaerobic Culture - Wound, Scrotum [[id number]] Collected: 06/04/23 1356    Specimen: Wound from Scrotum Updated: 06/04/23 1403    Basic Metabolic Panel [[id number]]  (Abnormal) Collected: 06/04/23 0400    Specimen: Blood Updated: 06/04/23 0442     Glucose 139 mg/dL      BUN 12 mg/dL      Creatinine 0.88 mg/dL      Sodium 138 mmol/L      Potassium 3.9 mmol/L      Chloride 101 mmol/L      CO2 25.0 mmol/L      Calcium 8.3 mg/dL      BUN/Creatinine Ratio 13.6     Anion Gap 12.0 mmol/L      eGFR 98.4 mL/min/1.73     Narrative:      GFR Normal >60  Chronic Kidney Disease <60  Kidney Failure <15      CBC & Differential [[id number]]  (Abnormal) Collected: 06/04/23 0400    Specimen: Blood Updated: 06/04/23 0417    Narrative:      The following orders were created for panel order CBC & Differential.  Procedure                               Abnormality         Status                     ---------                               -----------         ------                     CBC Auto Differential[[id number]]        Abnormal            Final result                 Please view results for these tests on the individual orders.    CBC Auto Differential [[id number]]  (Abnormal) Collected: 06/04/23 0400    Specimen: Blood Updated: 06/04/23 0417     WBC 8.90 10*3/mm3      RBC 4.70 10*6/mm3      Hemoglobin 14.9 g/dL      Hematocrit 42.9 %      MCV 91.3 fL      MCH 31.7 pg      MCHC 34.7 g/dL      RDW 14.0 %      RDW-SD 47.3 fl      MPV 9.0 fL      Platelets 172 10*3/mm3      Neutrophil % 71.2 %      Lymphocyte % 17.6 %      Monocyte % 9.4 %      Eosinophil % 1.3 %      Basophil % 0.5 %      Neutrophils, Absolute 6.30 10*3/mm3      Lymphocytes,  Absolute 1.60 10*3/mm3      Monocytes, Absolute 0.80 10*3/mm3      Eosinophils, Absolute 0.10 10*3/mm3      Basophils, Absolute 0.00 10*3/mm3      nRBC 0.3 /100 WBC             Imaging Results (Last 72 Hours)       Procedure Component Value Units Date/Time    US Outside Films [[id number]] Resulted: 06/06/23 1228     Updated: 06/06/23 1228    Narrative:      This procedure was auto-finalized with no dictation required.              Medication Review:     Hospital Medications (active)         Dose Frequency Start End    acetaminophen (TYLENOL) suppository 650 mg 650 mg Every 4 Hours PRN 6/4/2023     Admin Instructions: Do not exceed 4 grams of acetaminophen in a 24 hr period.    If given for pain, use the following pain scale:   Mild Pain = Pain Score of 1-3, CPOT 1-2  Moderate Pain = Pain Score of 4-6, CPOT 3-4  Severe Pain = Pain Score of 7-10, CPOT 5-8  Do not exceed 4 grams of acetaminophen in a 24 hr period. Max dose of 2gm for AST/ALT greater than 120 units/L    If given for fever, use fever parameter: fever greater than 100.4 °F.    If given for pain, use the following pain scale:   Mild Pain = Pain Score of 1-3, CPOT 1-2  Moderate Pain = Pain Score of 4-6, CPOT 3-4  Severe Pain = Pain Score of 7-10, CPOT 5-8    Route: Rectal    Linked Group 1: See [facility] for full Linked Orders Report.        acetaminophen (TYLENOL) tablet 650 mg 650 mg Every 4 Hours PRN 6/4/2023     Admin Instructions: Do not exceed 4 grams of acetaminophen in a 24 hr period.    If given for pain, use the following pain scale:   Mild Pain = Pain Score of 1-3, CPOT 1-2  Moderate Pain = Pain Score of 4-6, CPOT 3-4  Severe Pain = Pain Score of 7-10, CPOT 5-8  Do not exceed 4 grams of acetaminophen in a 24 hr period. Max dose of 2gm for AST/ALT greater than 120 units/L    If given for fever, use fever parameter: fever greater than 100.4 °F.    If given for pain, use the following pain scale:   Mild Pain = Pain Score of 1-3, CPOT 1-2  Moderate  Pain = Pain Score of 4-6, CPOT 3-4  Severe Pain = Pain Score of 7-10, CPOT 5-8    Route: Oral    Linked Group 1: See [facility] for full Linked Orders Report.        bisacodyl (DULCOLAX) EC tablet 5 mg 5 mg Daily PRN 6/4/2023     Admin Instructions: Use if no bowel movement after 12 hours.  Swallow whole. Do not crush, split, or chew tablet.    Route: Oral    Linked Group 2: See [facility]ers[facility] for full Linked Orders Report.        bisacodyl (DULCOLAX) suppository 10 mg 10 mg Daily PRN 6/4/2023     Admin Instructions: Use if no bowel movement after 12 hours.  Hold for diarrhea    Route: Rectal    Linked Group 2: See [facility]ers[facility] for full Linked Orders Report.        clindamycin (CLEOCIN) 600 mg in sodium chloride 0.9% 50 mL IVPB (premix) 600 mg Every 8 Hours 6/4/2023 6/11/2023    Admin Instructions: Do Not refrigerate.    Route: Intravenous    Enoxaparin Sodium (LOVENOX) syringe 40 mg 40 mg Every 12 Hours Scheduled 6/4/2023     Admin Instructions: Give subcutaneous in abdomen only. Do not massage site after injection.    Route: Subcutaneous    fentaNYL citrate (PF) (SUBLIMAZE) injection 50 mcg 50 mcg Every 15 Minutes PRN 6/4/2023     Admin Instructions: Maximum total dose of fentaNYL is 100 mcg. Based on patient request - if ordered for moderate or severe pain, provider allows for administration of a medication prescribed for a lower pain scale.  If given for pain, use the following pain scale:  Mild Pain = Pain Score of 1-3, CPOT 1-2  Moderate Pain = Pain Score of 4-6, CPOT 3-4  Severe Pain = Pain Score of 7-10, CPOT 5-8    Route: Intravenous    HYDROcodone-acetaminophen (NORCO) 7.5-325 MG per tablet 2 tablet 2 tablet Every 4 Hours PRN 6/4/2023 6/14/2023    Admin Instructions: [AIME]    Do not exceed 4 grams of acetaminophen in a 24 hr period.    If given for pain, use the following pain scale:   Mild Pain = Pain Score of 1-3, CPOT 1-2  Moderate Pain = Pain Score of 4-6, CPOT 3-4  Severe Pain = Pain Score of 7-10, CPOT  5-8  [AIME]    Do not exceed 4 grams of acetaminophen in a 24 hr period. Max dose of 2gm for AST/ALT greater than 120 units/L        If given for pain, use the following pain scale:   Mild Pain = Pain Score of 1-3, CPOT 1-2  Moderate Pain = Pain Score of 4-6, CPOT 3-4  Severe Pain = Pain Score of 7-10, CPOT 5-8    Route: Oral    HYDROmorphone (DILAUDID) injection 0.5 mg 0.5 mg Every 15 Minutes PRN 6/4/2023     Admin Instructions: Maximum total dose of HYDROmorphone is 2 mg. Based on patient request - if ordered for moderate or severe pain, provider allows for administration of a medication prescribed for a lower pain scale.      Caution: Look alike/sound alike drug alert    If given for pain, use the following pain scale:  Mild Pain = Pain Score of 1-3, CPOT 1-2  Moderate Pain = Pain Score of 4-6, CPOT 3-4  Severe Pain = Pain Score of 7-10, CPOT 5-8    Route: Intravenous    HYDROmorphone (DILAUDID) injection 0.5 mg 0.5 mg Every 2 Hours PRN 6/4/2023 6/11/2023    Admin Instructions: Based on patient request - if ordered for moderate or severe pain, provider allows for administration of a medication prescribed for a lower pain scale.      Caution: Look alike/sound alike drug alert    If given for pain, use the following pain scale:  Mild Pain = Pain Score of 1-3, CPOT 1-2  Moderate Pain = Pain Score of 4-6, CPOT 3-4  Severe Pain = Pain Score of 7-10, CPOT 5-8    Route: Intravenous    Linked Group 3: See [facility]ers[facility] for full Linked Orders Report.        morphine injection 4 mg 4 mg Every 2 Hours PRN 6/4/2023 6/14/2023    Admin Instructions: If given for pain, use the following pain scale:  Mild Pain = Pain Score of 1-3, CPOT 1-2  Moderate Pain = Pain Score of 4-6, CPOT 3-4  Severe Pain = Pain Score of 7-10, CPOT 5-8    Route: Intravenous    Linked Group 4: See [facility]ers[facility] for full Linked Orders Report.        naloxone (NARCAN) injection 0.1 mg 0.1 mg Every 5 Minutes PRN 6/4/2023     Admin Instructions: If respiratory rate  is less than 8 breaths/minute or patient is difficult to arouse stop any narcotics and contact physician. Administer slow IV push. Repeat as ordered until patient's respiratory rate is greater than 12 breaths/minute.    Route: Intravenous    Linked Group 3: See [facility]ers[facility] for full Linked Orders Report.        naloxone (NARCAN) injection 0.4 mg 0.4 mg As Needed 6/4/2023     Route: Intravenous    naloxone (NARCAN) injection 0.4 mg 0.4 mg Every 5 Minutes PRN 6/4/2023     Admin Instructions: If respiratory rate is less than 8 breaths/minute or patient is difficult to arouse stop any narcotics and contact physician.   Administer slow IV push. Repeat as ordered until patient's respiratory rate is greater than 12 breaths/minute.    Route: Intravenous    Linked Group 4: See [facility]ers[facility] for full Linked Orders Report.        nicotine (NICODERM CQ) 21 MG/24HR patch 1 patch 1 patch Every 24 Hours Scheduled 6/4/2023     Admin Instructions: Apply to clean, dry, nonhairy area of skin (typically upper arm or shoulder)   Acutely Hazardous. Waste BOTH Residual Medication and/or Empty Package.    Route: Transdermal    ondansetron (ZOFRAN) injection 4 mg 4 mg Every 6 Hours PRN 6/4/2023     Admin Instructions: Give Zofran first. Give Phenergan if Zofran not effective. Then if Phenergan not effective, give metoclopramide.    Route: Intravenous    Linked Group 5: See [facility]ers[facility] for full Linked Orders Report.        ondansetron (ZOFRAN) tablet 4 mg 4 mg Every 6 Hours PRN 6/4/2023     Admin Instructions: Give Zofran first. Give Phenergan if Zofran not effective. Then if Phenergan not effective, give metoclopramide.    Route: Oral    Linked Group 5: See [facility]ers[facility] for full Linked Orders Report.        polyethylene glycol (MIRALAX) packet 17 g 17 g Daily PRN 6/4/2023     Admin Instructions: Use if no bowel movement after 12 hours. Mix in 6-8 ounces of water.  Use 4-8 ounces of water, tea, or juice for each 17 gram dose.    Route: Oral     Linked Group 2: See Hypers[facility] for full Linked Orders Report.        sennosides-docusate (PERICOLACE) 8.6-50 MG per tablet 2 tablet 2 tablet 2 Times Daily 6/4/2023     Admin Instructions: HOLD MEDICATION IF PATIENT HAS HAD BOWEL MOVEMENT. Start bowel management regimen if patient has not had a bowel movement after 12 hours.    Route: Oral    Linked Group 2: See [facility]ers[facility] for full Linked Orders Report.        sodium chloride 0.9 % flush 10 mL 10 mL Every 12 Hours Scheduled 6/4/2023     Route: Intravenous    sodium chloride 0.9 % flush 10 mL 10 mL As Needed 6/4/2023     Route: Intravenous    sodium chloride 0.9 % infusion 40 mL 40 mL As Needed 6/4/2023     Admin Instructions: Following administration of an IV intermittent medication, flush line with 40mL NS at 100mL/hr.    Route: Intravenous    sodium chloride 0.9 % infusion 100 mL/hr Continuous 6/4/2023     Route: Intravenous          clindamycin, 600 mg, Intravenous, Q8H  enoxaparin, 40 mg, Subcutaneous, Q12H  nicotine, 1 patch, Transdermal, Q24H  senna-docusate sodium, 2 tablet, Oral, BID  sodium chloride, 10 mL, Intravenous, Q12H        Assessment & Plan         Scrotal abscess            S/P I&D     Plan :           C&S noted  Home on 10 days of Clinda 300mg TID  Will follow  Thank you           Reilly Calderon MD  06/06/23  13:22 EDT

## 2023-06-07 LAB
BACTERIA SPEC AEROBE CULT: NORMAL
GRAM STN SPEC: NORMAL
GRAM STN SPEC: NORMAL

## 2023-06-10 LAB — BACTERIA SPEC ANAEROBE CULT: ABNORMAL

## (undated) DEVICE — PANT MATERN PREMIERPRO MESH 2XL WHT DISP

## (undated) DEVICE — PAD,ABDOMINAL,5"X9",STERILE,LF,1/PK: Brand: MEDLINE INDUSTRIES, INC.

## (undated) DEVICE — BANDAGE,GAUZE,BULKEE II,4.5"X4.1YD,STRL: Brand: MEDLINE

## (undated) DEVICE — SPNG LAP PREWSH SFTPK 18X18IN STRL PK/5

## (undated) DEVICE — GLV SURG SIGNATURE ESSENTIAL PF LTX SZ7.5

## (undated) DEVICE — Device

## (undated) DEVICE — PK MINOR LAPAROTOMY 50

## (undated) DEVICE — SOLUTION,WATER,IRRIGATION,1000ML,STERILE: Brand: MEDLINE

## (undated) DEVICE — CVR HNDL LT SURG ACCSSRY BLU STRL

## (undated) DEVICE — GLV SURG SENSICARE PI MIC PF SZ7.5 LF STRL

## (undated) DEVICE — ESWAB LQ AMIES  REG. NYLON FLOCKED  APPLICATOR: Brand: ESWAB™

## (undated) DEVICE — TBG PENCL TELESCP MEGADYNE SMOKE EVAC 15FT

## (undated) DEVICE — TOTAL TRAY, DB, 100% SILI FOLEY, 16FR 10: Brand: MEDLINE

## (undated) DEVICE — KT SURG TURNOVER 050

## (undated) DEVICE — SOL IRRIG NACL 1000ML

## (undated) DEVICE — WET SKIN PREP TRAY: Brand: MEDLINE INDUSTRIES, INC.